# Patient Record
Sex: MALE | Race: WHITE
[De-identification: names, ages, dates, MRNs, and addresses within clinical notes are randomized per-mention and may not be internally consistent; named-entity substitution may affect disease eponyms.]

---

## 2018-02-09 ENCOUNTER — HOSPITAL ENCOUNTER (INPATIENT)
Dept: HOSPITAL 72 - EMR | Age: 83
LOS: 3 days | Discharge: HOME | DRG: 683 | End: 2018-02-12
Payer: MEDICARE

## 2018-02-09 VITALS — SYSTOLIC BLOOD PRESSURE: 145 MMHG | DIASTOLIC BLOOD PRESSURE: 62 MMHG

## 2018-02-09 VITALS — HEIGHT: 66 IN | BODY MASS INDEX: 27.48 KG/M2 | WEIGHT: 171 LBS

## 2018-02-09 VITALS — DIASTOLIC BLOOD PRESSURE: 70 MMHG | SYSTOLIC BLOOD PRESSURE: 136 MMHG

## 2018-02-09 VITALS — SYSTOLIC BLOOD PRESSURE: 160 MMHG | DIASTOLIC BLOOD PRESSURE: 69 MMHG

## 2018-02-09 VITALS — SYSTOLIC BLOOD PRESSURE: 135 MMHG | DIASTOLIC BLOOD PRESSURE: 66 MMHG

## 2018-02-09 DIAGNOSIS — I71.4: ICD-10-CM

## 2018-02-09 DIAGNOSIS — N28.1: ICD-10-CM

## 2018-02-09 DIAGNOSIS — F32.9: ICD-10-CM

## 2018-02-09 DIAGNOSIS — I25.10: ICD-10-CM

## 2018-02-09 DIAGNOSIS — Z53.29: ICD-10-CM

## 2018-02-09 DIAGNOSIS — R10.9: ICD-10-CM

## 2018-02-09 DIAGNOSIS — E11.9: ICD-10-CM

## 2018-02-09 DIAGNOSIS — E78.00: ICD-10-CM

## 2018-02-09 DIAGNOSIS — N13.8: ICD-10-CM

## 2018-02-09 DIAGNOSIS — N40.1: ICD-10-CM

## 2018-02-09 DIAGNOSIS — I10: ICD-10-CM

## 2018-02-09 DIAGNOSIS — R33.8: ICD-10-CM

## 2018-02-09 DIAGNOSIS — Z79.84: ICD-10-CM

## 2018-02-09 DIAGNOSIS — K57.90: ICD-10-CM

## 2018-02-09 DIAGNOSIS — R55: ICD-10-CM

## 2018-02-09 DIAGNOSIS — D64.9: ICD-10-CM

## 2018-02-09 DIAGNOSIS — N39.0: ICD-10-CM

## 2018-02-09 DIAGNOSIS — K52.9: ICD-10-CM

## 2018-02-09 DIAGNOSIS — E86.0: ICD-10-CM

## 2018-02-09 DIAGNOSIS — I95.9: ICD-10-CM

## 2018-02-09 DIAGNOSIS — Z88.6: ICD-10-CM

## 2018-02-09 DIAGNOSIS — E03.9: ICD-10-CM

## 2018-02-09 DIAGNOSIS — N17.9: Primary | ICD-10-CM

## 2018-02-09 LAB
ADD MANUAL DIFF: NO
ALBUMIN SERPL-MCNC: 3.2 G/DL (ref 3.4–5)
ALBUMIN/GLOB SERPL: 1.1 {RATIO} (ref 1–2.7)
ALP SERPL-CCNC: 59 U/L (ref 46–116)
ALT SERPL-CCNC: 25 U/L (ref 12–78)
ANION GAP SERPL CALC-SCNC: 8 MMOL/L (ref 5–15)
APPEARANCE UR: (no result)
APTT PPP: YELLOW S
AST SERPL-CCNC: 14 U/L (ref 15–37)
BASOPHILS NFR BLD AUTO: 0.6 % (ref 0–2)
BILIRUB SERPL-MCNC: 0.2 MG/DL (ref 0.2–1)
BUN SERPL-MCNC: 34 MG/DL (ref 7–18)
CALCIUM SERPL-MCNC: 9.1 MG/DL (ref 8.5–10.1)
CHLORIDE SERPL-SCNC: 104 MMOL/L (ref 98–107)
CK SERPL-CCNC: 97 U/L (ref 26–308)
CO2 SERPL-SCNC: 26 MMOL/L (ref 21–32)
CREAT SERPL-MCNC: 2 MG/DL (ref 0.55–1.3)
EOSINOPHIL NFR BLD AUTO: 2.2 % (ref 0–3)
ERYTHROCYTE [DISTWIDTH] IN BLOOD BY AUTOMATED COUNT: 13.7 % (ref 11.6–14.8)
GLOBULIN SER-MCNC: 3 G/DL
GLUCOSE UR STRIP-MCNC: NEGATIVE MG/DL
HCT VFR BLD CALC: 36.5 % (ref 42–52)
HGB BLD-MCNC: 12.4 G/DL (ref 14.2–18)
KETONES UR QL STRIP: NEGATIVE
LEUKOCYTE ESTERASE UR QL STRIP: (no result)
LYMPHOCYTES NFR BLD AUTO: 14 % (ref 20–45)
MCV RBC AUTO: 94 FL (ref 80–99)
MONOCYTES NFR BLD AUTO: 7.4 % (ref 1–10)
NEUTROPHILS NFR BLD AUTO: 75.8 % (ref 45–75)
NITRITE UR QL STRIP: NEGATIVE
PH UR STRIP: 5 [PH] (ref 4.5–8)
PLATELET # BLD: 160 K/UL (ref 150–450)
POTASSIUM SERPL-SCNC: 3.8 MMOL/L (ref 3.5–5.1)
PROT UR QL STRIP: (no result)
RBC # BLD AUTO: 3.87 M/UL (ref 4.7–6.1)
SODIUM SERPL-SCNC: 137 MMOL/L (ref 136–145)
SP GR UR STRIP: 1.01 (ref 1–1.03)
UROBILINOGEN UR-MCNC: NORMAL MG/DL (ref 0–1)
WBC # BLD AUTO: 12.2 K/UL (ref 4.8–10.8)

## 2018-02-09 PROCEDURE — 89050 BODY FLUID CELL COUNT: CPT

## 2018-02-09 PROCEDURE — 85651 RBC SED RATE NONAUTOMATED: CPT

## 2018-02-09 PROCEDURE — 99285 EMERGENCY DEPT VISIT HI MDM: CPT

## 2018-02-09 PROCEDURE — 93970 EXTREMITY STUDY: CPT

## 2018-02-09 PROCEDURE — 74176 CT ABD & PELVIS W/O CONTRAST: CPT

## 2018-02-09 PROCEDURE — 76700 US EXAM ABDOM COMPLETE: CPT

## 2018-02-09 PROCEDURE — 87045 FECES CULTURE AEROBIC BACT: CPT

## 2018-02-09 PROCEDURE — 84133 ASSAY OF URINE POTASSIUM: CPT

## 2018-02-09 PROCEDURE — 85730 THROMBOPLASTIN TIME PARTIAL: CPT

## 2018-02-09 PROCEDURE — 76775 US EXAM ABDO BACK WALL LIM: CPT

## 2018-02-09 PROCEDURE — 82728 ASSAY OF FERRITIN: CPT

## 2018-02-09 PROCEDURE — 82607 VITAMIN B-12: CPT

## 2018-02-09 PROCEDURE — 82550 ASSAY OF CK (CPK): CPT

## 2018-02-09 PROCEDURE — 83735 ASSAY OF MAGNESIUM: CPT

## 2018-02-09 PROCEDURE — 82746 ASSAY OF FOLIC ACID SERUM: CPT

## 2018-02-09 PROCEDURE — 93005 ELECTROCARDIOGRAM TRACING: CPT

## 2018-02-09 PROCEDURE — 84550 ASSAY OF BLOOD/URIC ACID: CPT

## 2018-02-09 PROCEDURE — 82150 ASSAY OF AMYLASE: CPT

## 2018-02-09 PROCEDURE — 85025 COMPLETE CBC W/AUTO DIFF WBC: CPT

## 2018-02-09 PROCEDURE — 84300 ASSAY OF URINE SODIUM: CPT

## 2018-02-09 PROCEDURE — 82378 CARCINOEMBRYONIC ANTIGEN: CPT

## 2018-02-09 PROCEDURE — 83550 IRON BINDING TEST: CPT

## 2018-02-09 PROCEDURE — 87324 CLOSTRIDIUM AG IA: CPT

## 2018-02-09 PROCEDURE — 85044 MANUAL RETICULOCYTE COUNT: CPT

## 2018-02-09 PROCEDURE — 84443 ASSAY THYROID STIM HORMONE: CPT

## 2018-02-09 PROCEDURE — 87086 URINE CULTURE/COLONY COUNT: CPT

## 2018-02-09 PROCEDURE — 84484 ASSAY OF TROPONIN QUANT: CPT

## 2018-02-09 PROCEDURE — 81003 URINALYSIS AUTO W/O SCOPE: CPT

## 2018-02-09 PROCEDURE — 84439 ASSAY OF FREE THYROXINE: CPT

## 2018-02-09 PROCEDURE — 82270 OCCULT BLOOD FECES: CPT

## 2018-02-09 PROCEDURE — 81001 URINALYSIS AUTO W/SCOPE: CPT

## 2018-02-09 PROCEDURE — 83540 ASSAY OF IRON: CPT

## 2018-02-09 PROCEDURE — 85610 PROTHROMBIN TIME: CPT

## 2018-02-09 PROCEDURE — 80053 COMPREHEN METABOLIC PANEL: CPT

## 2018-02-09 PROCEDURE — 84100 ASSAY OF PHOSPHORUS: CPT

## 2018-02-09 PROCEDURE — 82962 GLUCOSE BLOOD TEST: CPT

## 2018-02-09 PROCEDURE — 83690 ASSAY OF LIPASE: CPT

## 2018-02-09 PROCEDURE — 36415 COLL VENOUS BLD VENIPUNCTURE: CPT

## 2018-02-09 PROCEDURE — 83036 HEMOGLOBIN GLYCOSYLATED A1C: CPT

## 2018-02-09 RX ADMIN — HEPARIN SODIUM SCH UNITS: 5000 INJECTION INTRAVENOUS; SUBCUTANEOUS at 09:00

## 2018-02-09 RX ADMIN — PREGABALIN SCH MG: 50 CAPSULE ORAL at 21:26

## 2018-02-09 RX ADMIN — ALLOPURINOL SCH MG: 100 TABLET ORAL at 17:44

## 2018-02-09 RX ADMIN — HYDRALAZINE HYDROCHLORIDE SCH MG: 10 TABLET ORAL at 17:44

## 2018-02-09 RX ADMIN — HYDRALAZINE HYDROCHLORIDE SCH MG: 10 TABLET ORAL at 09:47

## 2018-02-09 RX ADMIN — INSULIN ASPART SCH UNITS: 100 INJECTION, SOLUTION INTRAVENOUS; SUBCUTANEOUS at 11:30

## 2018-02-09 RX ADMIN — PREGABALIN SCH MG: 50 CAPSULE ORAL at 09:47

## 2018-02-09 RX ADMIN — INSULIN ASPART SCH UNITS: 100 INJECTION, SOLUTION INTRAVENOUS; SUBCUTANEOUS at 21:25

## 2018-02-09 RX ADMIN — DULOXETINE HYDROCHLORIDE SCH MG: 30 CAPSULE, DELAYED RELEASE ORAL at 09:45

## 2018-02-09 RX ADMIN — INSULIN ASPART SCH UNITS: 100 INJECTION, SOLUTION INTRAVENOUS; SUBCUTANEOUS at 16:30

## 2018-02-09 RX ADMIN — ALLOPURINOL SCH MG: 100 TABLET ORAL at 09:46

## 2018-02-09 RX ADMIN — HEPARIN SODIUM SCH UNITS: 5000 INJECTION INTRAVENOUS; SUBCUTANEOUS at 21:00

## 2018-02-09 RX ADMIN — INSULIN ASPART SCH UNITS: 100 INJECTION, SOLUTION INTRAVENOUS; SUBCUTANEOUS at 06:30

## 2018-02-09 NOTE — CARDIOLOGY REPORT
--------------- APPROVED REPORT --------------





EKG Measurement

Heart [id number]

OR 240P

[id number]

[id number]

[id number]





Sinus rhythm with 1st degree AV block with premature supraventricular complexes

Inferior infarct, age undetermined

Abnormal ECG

## 2018-02-09 NOTE — EMERGENCY ROOM REPORT
History of Present Illness


General


Chief Complaint:  Abdominal Pain


Source:  Patient





Present Illness


HPI


Is an 82-year-old male with a history diabetes, high blood pressure and 

coronary disease.  He presents with chief complaint of weakness and near 

syncope.  The last 3 days he had profuse watery diarrhea.  No recent 

antibiotics.  Abdominal cramps.  He was on the toilet having diarrhea.  He 

stood up, he felt lightheaded and had near syncopal episode.  No loss of 

consciousness.  Better when he lies flat.  Per EMS he was hypotensive.  Better 

now.


Allergies:  


Coded Allergies:  


     MORPHINE (Verified  Allergy, Unknown, 2/9/18)





Patient History


Past Medical History:  see triage record, old chart reviewed, DM, CAD


Past Surgical History:  other


Pertinent Family History:  none


Social History:  Denies: smoking


Immunizations:  other


Reviewed Nursing Documentation:  PMH: Agreed, PSxH: Agreed





Nursing Documentation-PMH


Hx Cardiac Problems:  Yes


Hx Hypertension:  Yes


Hx Diabetes:  Yes





Review of Systems


Constitutional:  Reports: weakness


Eye:  Denies: eye pain, blurred vision


ENT:  Denies: ear pain, nose congestion, throat swelling


Respiratory:  Denies: cough, shortness of breath


Cardiovascular:  Denies: chest pain, palpitations


Gastrointestinal:  Reports: abdominal pain, diarrhea, Denies: nausea, vomiting


Musculoskeletal:  Denies: back pain, joint pain


Skin:  Denies: rash


Neurological:  Denies: headache, numbness


Endocrine:  Denies: increased thirst, increased urine


Hematologic/Lymphatic:  Denies: easy bruising


All Other Systems:  negative except mentioned in HPI





Physical Exam





Vital Signs








  Date Time  Temp Pulse Resp B/P (MAP) Pulse Ox O2 Delivery O2 Flow Rate FiO2


 


2/9/18 02:52 97.9 79 18 157/64 98 Room Air  





vitals with high blood pressure


Sp02 EP Interpretation:  reviewed, normal


General Appearance:  well appearing, no apparent distress, alert


Head:  normocephalic, atraumatic


Eyes:  bilateral eye PERRL, bilateral eye EOMI


ENT:  hearing grossly normal, normal pharynx


Neck:  full range of motion, supple, no meningismus


Respiratory:  chest non-tender, lungs clear, normal breath sounds


Cardiovascular #1:  regular rate, rhythm, no murmur


Gastrointestinal:  normal bowel sounds, no mass, no organomegaly, no bruit, non-

distended, tenderness - Diffuse


Musculoskeletal:  back normal, gait/station normal, normal range of motion


Psychiatric:  mood/affect normal


Skin:  warm/dry





Medical Decision Making


Diagnostic Impression:  


 Primary Impression:  


 Colitis, acute


 Additional Impressions:  


 Dehydration


 JULISSA (acute kidney injury)


 Protracted diarrhea


ER Course


Patient presents with protracted diarrhea and dehydration.  Near syncope 

probably secondary to prostatic hypotension.  He felt better now.  CT scan 

showed possible sigmoiditis.  Antibiotics given.  Will admit for IV hydration.


Lab Results Impression


labs with elevated BUN AND CREATININE


EKG Diagnostic Results


Rate:  normal


Rhythm:  NSR


ST Segments:  no acute changes





Rhythm Strip Diag. Results


Rhythm Strip Time:  05:35


EP Interpretation:  yes


Rate:  97


Rhythm:  NSR, no PVC's, no ectopy





CT/MRI/US Diagnostic Results


CT/MRI/US Diagnostic Results :  


   Imaging Test Ordered:  CT abdomen and pelvis


   Impression


Read by radiologist.  Enlarged prostate gland.  Moderate colonic 

diverticulosis.  Mild circumferential wall thickening of the sigmoid and rectum.





Last Vital Signs








  Date Time  Temp Pulse Resp B/P (MAP) Pulse Ox O2 Delivery O2 Flow Rate FiO2


 


2/9/18 03:05 97.8 70 17 160/69 97 Room Air  








Status:  improved


Disposition:  ADMITTED AS INPATIENT


Condition:  Serious


Referrals:  


NON PHYSICIAN (PCP)











RAGHU DE LOS SANTOS M.D. Feb 9, 2018 05:36

## 2018-02-09 NOTE — HISTORY & PHYSICAL
History and Physical


History & Physicial


Dictated for Int Med-Dr [id number].LEAH Pedersen Feb 9, 2018 20:45

## 2018-02-09 NOTE — GI INITIAL CONSULT NOTE
Pascale Graham N.P. 2/9/18 1030:


History of Present Illness


General


Date patient seen:  Feb 9, 2018


Time patient seen:  10:22


Reason for Hospitalization:  Abdominal Pain


Referring physician:  SCOTT MARTINEZ


Reason for Consultation:  ABDOMINAL PAIN





Present Illness


HPI


Is an 82-year-old male with a history diabetes, high blood pressure and 

coronary disease.  He presents with chief complaint of weakness and near 

syncope.  The last 3 days he had profuse watery diarrhea.  No recent 

antibiotics.  Abdominal cramps.  He was on the toilet having diarrhea.  He 

stood up, he felt lightheaded and had near syncopal episode.  No loss of 

consciousness.  Better when he lies flat.  Per EMS he was hypotensive.  Better 

now.


GI consulted for abdominal pain.  Pt seen on floor, awake A&Ox4 NAD with no 

active s/sx of N/V.  Patient had complaints of water diarrhea x 3 days he 

described as "black" and with seldom periods of noted red blood.  He has a 

history of prostate surgery approximately 2 months ago.  His last colonoscopy 

was 5 years ago with some found colonic polyps.  APCT today shows sigmoid, 

rectal wall thickening.  Denies any unintentional weight loss or changes in 

dietary habits.  No recent travels.  He presents today with mild leukocytosis, 

mild anemia, and renal insufficiency.


Home Meds


Reported Medications


Amlodipine Besylate* (AMLODIPINE BESYLATE*) 10 Mg Tablet, 0.5 TAB ORAL BID, TAB


   2/9/18


Allopurinol* (ALLOPURINOL*) 100 Mg Tablet, 100 MG ORAL BID, TAB


   2/9/18


Diphenhydramine Hcl* (DIPHENHYDRAMINE HCL*) 25 Mg Capsule, 25 MG ORAL BID Y for 

Itching, #30 CAP 0 Refills


   2/9/18


Hydrocortisone (CORTIZONE 10) 28 Gm Gel..gram., 28 GM TP DAILY, GM


   2/9/18


Atenolol* (TENORMIN*) 50 Mg Tablet, 50 MG ORAL DAILY, TAB


   2/9/18


Aspirin* (ASPIRIN*) 81 Mg Tab.chew, 81 MG ORAL DAILY, TAB


   2/9/18


Linagliptin (TRADJENTA) 5 Mg Tablet, 0.5 TAB PO BID, TAB


   2/9/18


Levothyroxine Sodium* (LEVOTHYROXINE SODIUM*) 25 Mcg Tablet, 1.5 TAB ORAL DAILY

, TAB


   Take in the morning on an empty stomach, at least 30 minutes before


   food.


   2/9/18


Isosorbide Mononitrate (ISOSORBIDE MONONITRATE ER) 60 Mg Tab.er.24h, 60 MG PO 

BID, TAB


   2/9/18


Hydralazine Hcl* (HYDRALAZINE HCL*) 50 Mg Tablet, 2 TAB ORAL BID, TAB


   2/9/18


Glimepiride* (GLIMEPIRIDE*) 4 Mg Tablet, 4 MG ORAL BID, TAB


   2/9/18


Furosemide* (LASIX*) 20 Mg Tablet, 10 MG ORAL DAILY, TAB


   2/9/18


Ferrous Sulfate* (FERROUS SULFATE*) 325 Mg Tablet, 325 MG ORAL DAILY, TAB 0 

Refills


   2/9/18


Duloxetine Hcl* (CYMBALTA*) 30 Mg Capsule.dr, 30 MG ORAL DAILY, CAP


   2/9/18


Terazosin Hcl (TERAZOSIN HCL) 10 Mg Capsule, 10 MG ORAL DAILY, CAP


   2/9/18


Rosuvastatin Calcium* (CRESTOR*) 10 Mg Tablet, 10 MG ORAL DAILY, TAB


   2/9/18


Pregabalin (Lyrica) 50 Mg Capsule, 50 MG ORAL BID, CAP


   2/9/18


Omega-3 Fatty Acids/Fish Oil (OMEGA 3 FISH OIL SOFTGEL) 1 Each Capsule.dr, 1 

EACH PO, CAP


   2/9/18


Med list reviewed/reconciled:  Yes


Allergies:  


Coded Allergies:  


     MORPHINE (Verified  Allergy, Unknown, 2/9/18)





Patient History


History Provided By:  Patient, Medical Record


PMH Narrative


Past Medical History:  see triage record, old chart reviewed, DM, CAD


Past Surgical History:  other


Pertinent Family History:  none


Social History:  Denies: smoking


Immunizations:  other


Reviewed Nursing Documentation:  PMH: Agreed, PSxH: Agreed





Nursing Documentation-PMH


Hx Cardiac Problems:  Yes


Hx Hypertension:  Yes


Hx Diabetes:  Yes





Review of Systems


Constitutional:  Reports: weakness


Eye:  Denies: eye pain, blurred vision


ENT:  Denies: ear pain, nose congestion, throat swelling


Respiratory:  Denies: cough, shortness of breath


Cardiovascular:  Denies: chest pain, palpitations


Gastrointestinal:  Reports: abdominal pain, diarrhea, Denies: nausea, vomiting


Musculoskeletal:  Denies: back pain, joint pain


Skin:  Denies: rash


Neurological:  Denies: headache, numbness


Endocrine:  Denies: increased thirst, increased urine


Hematologic/Lymphatic:  Denies: easy bruising


All Other Systems:  negative except mentioned in HPI


Social History:  Denies: smoking, alcohol use, drug use, other





Review of Systems


All Other Systems:  negative except mentioned in HPI





Physical Exam





Vital Signs








  Date Time  Temp Pulse Resp B/P (MAP) Pulse Ox O2 Delivery O2 Flow Rate FiO2


 


2/9/18 02:52 97.9 79 18 157/64 98 Room Air  








Sp02 EP Interpretation:  reviewed, normal


Labs





Laboratory Tests








Test


  2/9/18


03:33 2/9/18


04:00 2/9/18


06:00


 


White Blood Count


  12.2 K/UL


(4.8-10.8)  H 


  


 


 


Red Blood Count


  3.87 M/UL


(4.70-6.10)  L 


  


 


 


Hemoglobin


  12.4 G/DL


(14.2-18.0)  L 


  


 


 


Hematocrit


  36.5 %


(42.0-52.0)  L 


  


 


 


Mean Corpuscular Volume 94 FL (80-99)    


 


Mean Corpuscular Hemoglobin


  32.1 PG


(27.0-31.0)  H 


  


 


 


Mean Corpuscular Hemoglobin


Concent 34.0 G/DL


(32.0-36.0) 


  


 


 


Red Cell Distribution Width


  13.7 %


(11.6-14.8) 


  


 


 


Platelet Count


  160 K/UL


(150-450) 


  


 


 


Mean Platelet Volume


  9.5 FL


(6.5-10.1) 


  


 


 


Neutrophils (%) (Auto)


  75.8 %


(45.0-75.0)  H 


  


 


 


Lymphocytes (%) (Auto)


  14.0 %


(20.0-45.0)  L 


  


 


 


Monocytes (%) (Auto)


  7.4 %


(1.0-10.0) 


  


 


 


Eosinophils (%) (Auto)


  2.2 %


(0.0-3.0) 


  


 


 


Basophils (%) (Auto)


  0.6 %


(0.0-2.0) 


  


 


 


Sodium Level


  137 MMOL/L


(136-145) 


  


 


 


Potassium Level


  3.8 MMOL/L


(3.5-5.1) 


  


 


 


Chloride Level


  104 MMOL/L


() 


  


 


 


Carbon Dioxide Level


  26 MMOL/L


(21-32) 


  


 


 


Anion Gap


  8 mmol/L


(5-15) 


  


 


 


Blood Urea Nitrogen


  34 mg/dL


(7-18)  H 


  


 


 


Creatinine


  2.0 MG/DL


(0.55-1.30)  H 


  


 


 


Estimat Glomerular Filtration


Rate  mL/min (>60)  


  


  


 


 


Glucose Level


  146 MG/DL


()  H 


  


 


 


Calcium Level


  9.1 MG/DL


(8.5-10.1) 


  


 


 


Total Bilirubin


  0.2 MG/DL


(0.2-1.0) 


  


 


 


Aspartate Amino Transf


(AST/SGOT) 14 U/L (15-37)


L 


  


 


 


Alanine Aminotransferase


(ALT/SGPT) 25 U/L (12-78)


  


  


 


 


Alkaline Phosphatase


  59 U/L


() 


  


 


 


Total Protein


  6.2 G/DL


(6.4-8.2)  L 


  


 


 


Albumin


  3.2 G/DL


(3.4-5.0)  L 


  


 


 


Globulin 3.0 g/dL    


 


Albumin/Globulin Ratio 1.1 (1.0-2.7)    


 


Lipase


  120 U/L


() 


  


 


 


Troponin I


  


  0.010 ng/mL


(0.000-0.056) 


 


 


Urine Color   Yellow  


 


Urine Appearance   Cloudy  


 


Urine pH   5 (4.5-8.0)  


 


Urine Specific Gravity


  


  


  1.015


(1.005-1.035)


 


Urine Protein


  


  


  3+ (NEGATIVE)


H


 


Urine Glucose (UA)


  


  


  Negative


(NEGATIVE)


 


Urine Ketones


  


  


  Negative


(NEGATIVE)


 


Urine Occult Blood


  


  


  4+ (NEGATIVE)


H


 


Urine Nitrite


  


  


  Negative


(NEGATIVE)


 


Urine Bilirubin


  


  


  Negative


(NEGATIVE)


 


Urine Urobilinogen


  


  


  Normal MG/DL


(0.0-1.0)


 


Urine Leukocyte Esterase


  


  


  3+ (NEGATIVE)


H


 


Urine RBC


  


  


  60-80 /HPF (0


- 0)  H


 


Urine WBC


  


  


  Tntc /HPF (0 -


0)  H


 


Urine Squamous Epithelial


Cells 


  


  Few /LPF


(NONE/OCC)


 


Urine Bacteria


  


  


  Moderate /HPF


(NONE)  H








General Appearance:  well appearing, no apparent distress, alert


Head:  normocephalic


EENT:  PERRL/EOMI, normal ENT inspection


Neck:  supple


Respiratory:  normal breath sounds, no respiratory distress


Cardiovascular:  normal rate


Gastrointestinal:  normal inspection, non tender, soft, normal bowel sounds, non

-distended


Rectal:  deferred


Genitourinary:  deferred


Musculoskeletal:  normal inspection, back normal


Neurologic:  normal inspection, alert, oriented x3, responsive


Psychiatric:  normal inspection, judgement/insight normal, memory normal


Skin:  normal inspection, normal color, no rash, warm/dry, palpation normal, 

well hydrated


Lymphatic:  normal inspection, no adenopathy


Current Medications





Current Medications








 Medications


  (Trade)  Dose


 Ordered  Sig/Tramaine


 Route


 PRN Reason  Start Time


 Stop Time Status Last Admin


Dose Admin


 


 Acetaminophen


  (Tylenol)  650 mg  Q4H  PRN


 ORAL


 fever  2/9/18 06:15


 3/11/18 06:14   


 


 


 Al Hydroxide/Mg


 Hydroxide


  (Mylanta II)  30 ml  Q6H  PRN


 ORAL


 dyspepsia  2/9/18 06:15


 3/11/18 06:14   


 


 


 Allopurinol


  (Zyloprim)  100 mg  BID


 ORAL


   2/9/18 09:00


 3/11/18 08:59  2/9/18 09:46


 


 


 Amlodipine


 Besylate


  (Norvasc)  5 mg  DAILY


 ORAL


   2/9/18 09:00


 3/11/18 08:59  2/9/18 09:46


 


 


 Atenolol


  (Tenormin)  50 mg  DAILY


 ORAL


   2/9/18 09:00


 3/11/18 08:59  2/9/18 09:45


 


 


 Dextrose


  (Dextrose 50%)    STAT  PRN


 IV


 Hypoglycemia  2/9/18 06:15


 3/11/18 06:14   


 


 


 Diphenhydramine


 HCl


  (Benadryl)  25 mg  Q6H  PRN


 ORAL


 Itching/Pruritis  2/9/18 06:15


 3/11/18 06:14   


 


 


 Duloxetine HCl


  (Cymbalta)  30 mg  DAILY


 ORAL


   2/9/18 09:00


 3/11/18 08:59  2/9/18 09:45


 


 


 Heparin Sodium


  (Porcine)


  (Heparin 5000


 units/ml)  5,000 units  EVERY 12  HOURS


 SUBQ


   2/9/18 09:00


 3/11/18 08:59   


 


 


 Hydralazine HCl


  (Apresoline)  20 mg  BID


 ORAL


   2/9/18 09:00


 3/11/18 08:59  2/9/18 09:47


 


 


 Insulin Aspart


  (NovoLOG)    BEFORE MEALS AND  HS


 SUBQ


   2/9/18 06:30


 3/11/18 06:29   


 


 


 Ketorolac


 Tromethamine


  (Toradol 30mg)  30 mg  Q6H  PRN


 IV


 moderate pian 4-6  2/9/18 06:15


 2/14/18 06:14 UNV  


 


 


 Levothyroxine


 Sodium


  (Synthroid)  75 mcg  DAILY@0630


 ORAL


   2/9/18 06:30


 3/11/18 06:29   


 


 


 Nitroglycerin


  (Ntg)  0.4 mg  Q5M X 3 DOSES PRN


 SL


 Prn Chest Pain  2/9/18 06:15


 3/11/18 06:14   


 


 


 Ondansetron HCl


  (Zofran)  4 mg  Q6H  PRN


 IVP


 Nausea & Vomiting  2/9/18 06:15


 3/11/18 06:14   


 


 


 Polyethylene


 Glycol


  (Miralax)  17 gm  HSPRN  PRN


 ORAL


 Constipation  2/9/18 06:15


 3/11/18 06:14   


 


 


 Pregabalin


  (Lyrica)  50 mg  Q12HR


 ORAL


   2/9/18 09:00


 3/11/18 08:59  2/9/18 09:47


 


 


 Sodium Chloride  1,000 ml @ 


 50 mls/hr  Q20H


 IV


   2/9/18 06:03


 3/11/18 06:02  2/9/18 06:46


 


 


 Temazepam


  (Restoril)  15 mg  HSPRN  PRN


 ORAL


 Insomnia  2/9/18 06:15


 2/16/18 06:14   


 











GI: Plan


Problems:  


(1) Colitis, acute


(2) Protracted diarrhea


(3) Dehydration


Plan


recommend colonoscopy, patient refuses at this time and request his POC be 

discussed with his primary care 


adv to renal diet after imaging studies >> renal/abdominal U/S


electrolyte correction


send for stool studies, cdiff


anemia work up


OB stool r/o GI bleed


monitor H&H, prn transfusions


ppi


fu labs





Discussed with Dr. Teague.


Thank you for this patient referral, we will follow.





SANJAY TEAGUE 2/12/18 1517:


History of Present Illness


General


Reason for Hospitalization:  Abdominal Pain





Present Illness


Home Meds


Reported Medications


Amlodipine Besylate* (AMLODIPINE BESYLATE*) 10 Mg Tablet, 0.5 TAB ORAL BID, TAB


   2/9/18


Allopurinol* (ALLOPURINOL*) 100 Mg Tablet, 100 MG ORAL BID, TAB


   2/9/18


Diphenhydramine Hcl* (DIPHENHYDRAMINE HCL*) 25 Mg Capsule, 25 MG ORAL BID Y for 

Itching, #30 CAP 0 Refills


   2/9/18


Hydrocortisone (CORTIZONE 10) 28 Gm Gel..gram., 28 GM TP DAILY, GM


   2/9/18


Atenolol* (TENORMIN*) 50 Mg Tablet, 50 MG ORAL DAILY, TAB


   2/9/18


Aspirin* (ASPIRIN*) 81 Mg Tab.chew, 81 MG ORAL DAILY, TAB


   2/9/18


Linagliptin (TRADJENTA) 5 Mg Tablet, 0.5 TAB PO BID, TAB


   2/9/18


Levothyroxine Sodium* (LEVOTHYROXINE SODIUM*) 25 Mcg Tablet, 1.5 TAB ORAL DAILY

, TAB


   Take in the morning on an empty stomach, at least 30 minutes before


   food.


   2/9/18


Isosorbide Mononitrate (ISOSORBIDE MONONITRATE ER) 60 Mg Tab.er.24h, 60 MG PO 

BID, TAB


   2/9/18


Hydralazine Hcl* (HYDRALAZINE HCL*) 50 Mg Tablet, 2 TAB ORAL BID, TAB


   2/9/18


Glimepiride* (GLIMEPIRIDE*) 4 Mg Tablet, 4 MG ORAL BID, TAB


   2/9/18


Furosemide* (LASIX*) 20 Mg Tablet, 10 MG ORAL DAILY, TAB


   2/9/18


Ferrous Sulfate* (FERROUS SULFATE*) 325 Mg Tablet, 325 MG ORAL DAILY, TAB 0 

Refills


   2/9/18


Duloxetine Hcl* (CYMBALTA*) 30 Mg Capsule.dr, 30 MG ORAL DAILY, CAP


   2/9/18


Terazosin Hcl (TERAZOSIN HCL) 10 Mg Capsule, 10 MG ORAL DAILY, CAP


   2/9/18


Rosuvastatin Calcium* (CRESTOR*) 10 Mg Tablet, 10 MG ORAL DAILY, TAB


   2/9/18


Pregabalin (Lyrica) 50 Mg Capsule, 50 MG ORAL BID, CAP


   2/9/18


Omega-3 Fatty Acids/Fish Oil (OMEGA 3 FISH OIL SOFTGEL) 1 Each Capsule.dr, 1 

EACH PO, CAP


   2/9/18


Allergies:  


Coded Allergies:  


     MORPHINE (Verified  Allergy, Unknown, 2/9/18)





GI: Plan


Plan


The patient was seen and examined at bedside and all new and available data was 

reviewed in the patients chart. I agree with the above findings, impression 

and plan.  (Patient seen earlier today. Signature stamp does not reflect 

patient encounter time.). - MD Gladys [facility] N.PEd Feb 9, 2018 10:30


SANJAY TEAGUE Feb 12, 2018 15:17

## 2018-02-09 NOTE — HISTORY AND PHYSICAL REPORT
DATE OF ADMISSION:  02/09/2018



CHIEF COMPLAINT:  The patient is an 82-year-old white male, presents with

chief complaint of diarrhea and near syncope.



HISTORY OF PRESENT ILLNESS:  Began three days prior to admission.  The

patient began to have watery diarrhea.  The patient had multiple bowel

movements daily.  The patient was complaining of increasing weakness.  The

patient almost passed out.



The patient presented to [place] emergency room.  The patient was found

to be hypotensive.  The patient was admitted for dehydration and

diarrhea.



REVIEW OF SYSTEMS:  CONSTITUTIONAL:  The patient denies weight loss or

weight gain.  The patient denies fevers or chills   HEENT:  The patient

denies ear or throat pain.  The patient denies headache.

CARDIOVASCULAR:  The patient denies palpitations or chest pain.    CHEST:

The patient denies wheezes or shortness of breath.  ABDOMEN:  The patient

complains of diarrhea as above.  The patient denies nausea, vomiting, or

constipation.  GENITOURINARY:  The patient denies dysuria or increased

frequency of urination.    NEUROMUSCULAR:  The patient denies seizures or

generalized weakness.



PAST MEDICAL HISTORY:  Significant for:



1. Hypertension.

2. Diabetes type 2.

3. Hypothyroidism.

4. Hypercholesterolemia.

5. Depression.



PAST SURGICAL HISTORY:  The patient denies.



CURRENT MEDICATIONS:

1. Allopurinol 100 mg p.o. twice daily.

2. Amlodipine 10 mg one half tablet p.o. twice daily.

3. Aspirin 81 mg p.o. daily.

4. Atenolol 50 mg p.o. daily.

5. Cymbalta 30 mg p.o. daily.

6. Iron sulfate 325 mg p.o. daily.

7. Lasix 10 mg p.o. daily.

8. Glimepiride 4 mg p.o. twice daily.

9. Hydralazine 50 mg p.o. twice daily.

10. Cortisone as needed.

11. Isosorbide mononitrate 60 mg p.o. twice daily.

12. Levoxyl 25 mcg one and half tablets p.o. daily.

13. Tradjenta 5 mg by mouth one and half tablet p.o. twice daily.

14. Lyrica 50 mg p.o. twice daily.

15. Crestor 10 mg p.o. daily.

16. Terazosin 10 mg p.o. daily.



ALLERGIES:  To morphine.



SOCIAL HISTORY:  The patient is single.  The patient denies tobacco or

alcohol use.



PHYSICAL EXAMINATION:

VITAL SIGNS:  Temperature 97.9, respirations 22, pulse 93, and blood

pressure 145/62.

GENERAL:  The patient is well-developed and well-nourished elderly white

male, in no apparent distress.

HEENT:  Eyes, pupils are equal and responsive to light and accommodation.

Extraocular movements are intact.

NECK:  Supple without lymphadenopathy.

CHEST:  Lungs are clear to auscultation bilaterally without wheezes or

rales.

CARDIOVASCULAR:  Regular rhythm and rate.  S1, S2 normal without murmurs,

rubs, or gallops.

ABDOMEN:  Soft, distended with decreased bowel sounds.  No evidence of

hepatosplenomegaly.  Currently, no rebound or guarding noted.

EXTREMITIES:  Negative for clubbing, cyanosis, or edema.

RECTAL/GENITAL:  Refused.

NEUROLOGIC:  Cranial nerves II through XII are grossly intact without focal

deficits.  Motor strength is 5/5 bilaterally.  Deep tendon reflexes 2+

plantar.



LABORATORY AND DIAGNOSTIC DATA:  Laboratory studies, WBC 12.2, hemoglobin

12.4, hematocrit 36.5, and platelets _____.  Sodium 137, potassium 3.8,

chloride 104, CO2 26, BUN 34, creatinine 2.0, and glucose 146.  A CT scan

of the abdomen showed diverticulosis without diverticulitis.



ASSESSMENT:  This is an 82-year-old white male with,



1. Near syncope.

2. Diarrhea.

3. Dehydration.

4. Diverticulosis.

5. Renal failure.

6. Hypertension.

7. Diabetes type 2.

8. Hypothyroidism.

9. Hypercholesterolemia.



IMPRESSION:

1. Diarrhea.  Gastroenterology consultation has been obtained with Dr. Mitchel Bingham.  A colonoscopy has been ordered.  We will follow

recommendations of Gastroenterology.  Stool studies are pending.

2. Renal failure.  A Nephrology consultation has been obtained with Dr. Salvador.

3. Dehydration.  The patient is receiving intravenous fluids.

4. Hypertension.  Continue amlodipine and atenolol as above.

5. Diabetes type 2.  The patient has been started on a regular insulin

sliding scale.

6. Hypothyroidism.  Continue Levoxyl as above.

7. Hypercholesteremia.  Continue Crestor as above.

8. Depression.  Continue Cymbalta as above.









  ______________________________________________

  Alexx SOPHIA Mejias





DR:  CHRIS

D:  02/09/2018 20:44

T:  02/09/2018 23:29

JOB#:  [id number]

CC:

## 2018-02-09 NOTE — DIAGNOSTIC IMAGING REPORT
Indication: Abdominal pain x3 days

 

Technique: Spiral acquisitions obtained through the abdomen and pelvis. No oral

contrast utilized, per emergency room physician request No IV contrast utilized,  per

referring physician request.. Multiplanar reconstructions were generated. Total dose

length product 883.19 mGycm. CTDIvol(s) 16.14 mGy. Dose reduction achieved using

automated exposure control

 

 

Comparison: None

 

Findings: 

 

Findings is normal. There is colonic diverticulosis. No definite evidence of acute

diverticulitis. No small bowel distention. No free or loculated peritoneal air or

fluid. There is a small sliding hiatal hernia. The remainder of the stomach is

unremarkable. The there is a small duodenal diverticulum.

 

Lack of IV contrast limits assessment of the solid organs. The liver, gallbladder,

bile ducts, pancreas, spleen, adrenals are unremarkable. The kidneys demonstrate

multiple cysts. There are also multiple masses that demonstrate nonspecific soft

tissue attenuation, only one of which, a small one in the right upper pole, is

definitively hyperdense. No pelvic mass or adenopathy. The prostate is markedly

enlarged, measuring 7.4 cm transverse by 6.1 cm AP by 7.4 cm craniocaudad.

 

There is a saccular infrarenal abdominal aortic aneurysm, that measures 5.1 cm

transverse dimension. There are no findings to suggest leakage or rupture.

 

The included lung bases demonstrate posterior dependent atelectatic changes. The

heart is enlarged. The bones demonstrate degenerative spondylosis changes. There is

slight anterior offset of L4 on L5.

 

Impression: Colonic diverticulosis. No evidence of diverticulitis

 

5.1 cm saccular infrarenal abdominal aortic aneurysm

 

Prostatomegaly

 

Bilateral renal cysts. Bilateral renal soft tissue attenuation lesions which are most

likely benign definitively hyperdense cyst. Further evaluation with ultrasound

recommended.

 

Cardiomegaly

 

Degenerative spondylosis changes

 

This agrees with the preliminary interpretation provided overnight by [facility]

teleradiology service. 

 

The CT scanner at [facility] is accredited by the [employer]

[employer] and the scans are performed using protocols designed to limit radiation

exposure to as low as reasonably achievable to attain images of sufficient resolution

adequate for diagnostic evaluation.

## 2018-02-09 NOTE — CONSULTATION
History of Present Illness


General


Date patient seen:  Feb 9, 2018


Chief Complaint:  Abdominal Pain


Referring physician:  SCOTT MARTINEZ


Reason for Consultation:  inpatient manangement





Present Illness


HPI


 82-year-old male with a history diabetes, high blood pressure and coronary 

disease.  He presents with chief complaint of weakness and near syncope.  The 

last 3 days he had profuse watery diarrhea and  Abdominal cramps.  He was on 

the toilet having diarrhea.  He stood up, he felt lightheaded and had near 

syncopal episode.  No loss of consciousness.  Better when he lies flat.  Per 

EMS he was hypotensive in the field.  Pt is admitted because of hypotension and 

diarrhea and ATN.


Allergies:  


Coded Allergies:  


     MORPHINE (Verified  Allergy, Unknown, 2/9/18)





Medication History


Scheduled


Allopurinol* (Allopurinol*), 100 MG ORAL BID, (Reported)


Amlodipine Besylate* (Amlodipine Besylate*), 0.5 TAB ORAL BID, (Reported)


Aspirin* (Aspirin*), 81 MG ORAL DAILY, (Reported)


Atenolol* (Tenormin*), 50 MG ORAL DAILY, (Reported)


Duloxetine Hcl* (Cymbalta*), 30 MG ORAL DAILY, (Reported)


Ferrous Sulfate* (Ferrous Sulfate*), 325 MG ORAL DAILY, (Reported)


Furosemide* (Lasix*), 10 MG ORAL DAILY, (Reported)


Glimepiride* (Glimepiride*), 4 MG ORAL BID, (Reported)


Hydralazine Hcl* (Hydralazine Hcl*), 2 TAB ORAL BID, (Reported)


Hydrocortisone (Cortizone 10), 28 GM TP DAILY, (Reported)


Isosorbide Mononitrate (Isosorbide Mononitrate Er), 60 MG PO BID, (Reported)


Levothyroxine Sodium* (Levothyroxine Sodium*), 1.5 TAB ORAL DAILY, (Reported)


Linagliptin (Tradjenta), 0.5 TAB PO BID, (Reported)


Pregabalin (Lyrica), 50 MG ORAL BID, (Reported)


Rosuvastatin Calcium* (Crestor*), 10 MG ORAL DAILY, (Reported)


Terazosin Hcl (Terazosin Hcl), 10 MG ORAL DAILY, (Reported)





Scheduled PRN


Diphenhydramine Hcl* (Diphenhydramine Hcl*), 25 MG ORAL BID PRN for Itching, (

Reported)





Miscellaneous Medications


Omega-3 Fatty Acids/Fish Oil (Omega 3 Fish Oil Softgel), 1 EACH PO, (Reported)





Patient History


Healthcare decision maker





Resuscitation status


Full Code


Advanced Directive on File


No





Past Medical/Surgical History


Past Medical/Surgical History:  


(1) Abdominal aortic aneurysm (AAA) 3.0 cm to 5.5 cm in diameter in male


(2) HTN (hypertension)


(3) Hypothyroidism


(4) Hypercholesteremia


(5) Diabetes mellitus, type II


(6) BPH (benign prostatic hyperplasia)





Review of Systems


Constitutional:  Reports: malaise, weakness


Gastrointestinal:  Reports: diarrhea, vomiting


All Other Systems:  negative except mentioned in HPI





Physical Exam


General Appearance:  WD/WN


Lines, tubes and drains:  peripheral


HEENT:  normocephalic, atraumatic


Neck:  non-tender, normal alignment


Respiratory/Chest:  chest wall non-tender, lungs clear


Cardiovascular/Chest:  normal peripheral pulses, normal rate


Abdomen:  normal bowel sounds, non tender


Extremities:  normal range of motion, non-tender





Last 24 Hour Vital Signs








  Date Time  Temp Pulse Resp B/P (MAP) Pulse Ox O2 Delivery O2 Flow Rate FiO2


 


2/9/18 17:44    136/70    


 


2/9/18 16:11 97.3 89 21 136/70 97 Room Air  


 


2/9/18 09:47    145/62    


 


2/9/18 09:46  93  145/62    


 


2/9/18 09:45  93  145/62    


 


2/9/18 07:43 97.9 93 22 145/62 99 Room Air  


 


2/9/18 06:20 97.9 93 22 145/62 99 Room Air  


 


2/9/18 05:00  90 25 135/66 98 Room Air  


 


2/9/18 03:05 97.8 70 17 160/69 97 Room Air  


 


2/9/18 02:52 97.9 79 18 157/64 98 Room Air  

















Intake and Output  


 


 2/8/18 2/9/18





 18:59 06:59


 


Intake Total  1100 ml


 


Output Total  200 ml


 


Balance  900 ml


 


  


 


IV Total  1100 ml


 


Output Urine Total  200 ml


 


# Voids  1











Laboratory Tests








Test


  2/9/18


03:33 2/9/18


04:00 2/9/18


06:00 2/9/18


10:52


 


White Blood Count


  12.2 K/UL


(4.8-10.8)  H 


  


  


 


 


Red Blood Count


  3.87 M/UL


(4.70-6.10)  L 


  


  


 


 


Hemoglobin


  12.4 G/DL


(14.2-18.0)  L 


  


  


 


 


Hematocrit


  36.5 %


(42.0-52.0)  L 


  


  


 


 


Mean Corpuscular Volume 94 FL (80-99)     


 


Mean Corpuscular Hemoglobin


  32.1 PG


(27.0-31.0)  H 


  


  


 


 


Mean Corpuscular Hemoglobin


Concent 34.0 G/DL


(32.0-36.0) 


  


  


 


 


Red Cell Distribution Width


  13.7 %


(11.6-14.8) 


  


  


 


 


Platelet Count


  160 K/UL


(150-450) 


  


  


 


 


Mean Platelet Volume


  9.5 FL


(6.5-10.1) 


  


  


 


 


Neutrophils (%) (Auto)


  75.8 %


(45.0-75.0)  H 


  


  


 


 


Lymphocytes (%) (Auto)


  14.0 %


(20.0-45.0)  L 


  


  


 


 


Monocytes (%) (Auto)


  7.4 %


(1.0-10.0) 


  


  


 


 


Eosinophils (%) (Auto)


  2.2 %


(0.0-3.0) 


  


  


 


 


Basophils (%) (Auto)


  0.6 %


(0.0-2.0) 


  


  


 


 


Sodium Level


  137 MMOL/L


(136-145) 


  


  


 


 


Potassium Level


  3.8 MMOL/L


(3.5-5.1) 


  


  


 


 


Chloride Level


  104 MMOL/L


() 


  


  


 


 


Carbon Dioxide Level


  26 MMOL/L


(21-32) 


  


  


 


 


Anion Gap


  8 mmol/L


(5-15) 


  


  


 


 


Blood Urea Nitrogen


  34 mg/dL


(7-18)  H 


  


  


 


 


Creatinine


  2.0 MG/DL


(0.55-1.30)  H 


  


  


 


 


Estimat Glomerular Filtration


Rate  mL/min (>60)  


  


  


  


 


 


Glucose Level


  146 MG/DL


()  H 


  


  


 


 


Calcium Level


  9.1 MG/DL


(8.5-10.1) 


  


  


 


 


Total Bilirubin


  0.2 MG/DL


(0.2-1.0) 


  


  


 


 


Aspartate Amino Transf


(AST/SGOT) 14 U/L (15-37)


L 


  


  


 


 


Alanine Aminotransferase


(ALT/SGPT) 25 U/L (12-78)


  


  


  


 


 


Alkaline Phosphatase


  59 U/L


() 


  


  


 


 


Total Protein


  6.2 G/DL


(6.4-8.2)  L 


  


  


 


 


Albumin


  3.2 G/DL


(3.4-5.0)  L 


  


  


 


 


Globulin 3.0 g/dL     


 


Albumin/Globulin Ratio 1.1 (1.0-2.7)     


 


Lipase


  120 U/L


() 


  


  


 


 


Uric Acid


  


  5.7 MG/DL


(2.6-7.2) 


  


 


 


Total Creatine Kinase


  


  97 U/L


() 


  


 


 


Troponin I


  


  0.010 ng/mL


(0.000-0.056) 


  


 


 


Urine Color   Yellow   


 


Urine Appearance   Cloudy   


 


Urine pH   5 (4.5-8.0)   


 


Urine Specific Gravity


  


  


  1.015


(1.005-1.035) 


 


 


Urine Protein


  


  


  3+ (NEGATIVE)


H 


 


 


Urine Glucose (UA)


  


  


  Negative


(NEGATIVE) 


 


 


Urine Ketones


  


  


  Negative


(NEGATIVE) 


 


 


Urine Occult Blood


  


  


  4+ (NEGATIVE)


H 


 


 


Urine Nitrite


  


  


  Negative


(NEGATIVE) 


 


 


Urine Bilirubin


  


  


  Negative


(NEGATIVE) 


 


 


Urine Urobilinogen


  


  


  Normal MG/DL


(0.0-1.0) 


 


 


Urine Leukocyte Esterase


  


  


  3+ (NEGATIVE)


H 


 


 


Urine RBC


  


  


  60-80 /HPF (0


- 0)  H 


 


 


Urine WBC


  


  


  Tntc /HPF (0 -


0)  H 


 


 


Urine Squamous Epithelial


Cells 


  


  Few /LPF


(NONE/OCC) 


 


 


Urine Bacteria


  


  


  Moderate /HPF


(NONE)  H 


 


 


Stool Occult Blood


  


  


  


  Negative


(NEGATIVE)








Height (Feet):  5


Height (Inches):  6.00


Weight (Pounds):  171


Medications





Current Medications








 Medications


  (Trade)  Dose


 Ordered  Sig/Tramaine


 Route


 PRN Reason  Start Time


 Stop Time Status Last Admin


Dose Admin


 


 Acetaminophen


  (Tylenol)  650 mg  Q4H  PRN


 ORAL


 fever  2/9/18 06:15


 3/11/18 06:14   


 


 


 Acetaminophen


  (Tylenol)  650 mg  Q4H  PRN


 ORAL


 For Pain  2/9/18 13:00


 3/11/18 12:59   


 


 


 Al Hydroxide/Mg


 Hydroxide


  (Mylanta II)  30 ml  Q6H  PRN


 ORAL


 dyspepsia  2/9/18 06:15


 3/11/18 06:14   


 


 


 Allopurinol


  (Zyloprim)  100 mg  BID


 ORAL


   2/9/18 09:00


 3/11/18 08:59  2/9/18 17:44


 


 


 Amlodipine


 Besylate


  (Norvasc)  5 mg  DAILY


 ORAL


   2/9/18 09:00


 3/11/18 08:59  2/9/18 09:46


 


 


 Atenolol


  (Tenormin)  50 mg  DAILY


 ORAL


   2/9/18 09:00


 3/11/18 08:59  2/9/18 09:45


 


 


 Dextrose


  (Dextrose 50%)    STAT  PRN


 IV


 Hypoglycemia  2/9/18 06:15


 3/11/18 06:14   


 


 


 Diphenhydramine


 HCl


  (Benadryl)  25 mg  Q6H  PRN


 ORAL


 Itching/Pruritis  2/9/18 06:15


 3/11/18 06:14   


 


 


 Duloxetine HCl


  (Cymbalta)  30 mg  DAILY


 ORAL


   2/9/18 09:00


 3/11/18 08:59  2/9/18 09:45


 


 


 Heparin Sodium


  (Porcine)


  (Heparin 5000


 units/ml)  5,000 units  EVERY 12  HOURS


 SUBQ


   2/9/18 09:00


 3/11/18 08:59   


 


 


 Hydralazine HCl


  (Apresoline)  20 mg  BID


 ORAL


   2/9/18 09:00


 3/11/18 08:59  2/9/18 17:44


 


 


 Insulin Aspart


  (NovoLOG)    BEFORE MEALS AND  HS


 SUBQ


   2/9/18 06:30


 3/11/18 06:29  2/9/18 21:25


 


 


 Levothyroxine


 Sodium


  (Synthroid)  75 mcg  DAILY@0630


 ORAL


   2/9/18 06:30


 3/11/18 06:29   


 


 


 Nitroglycerin


  (Ntg)  0.4 mg  Q5M X 3 DOSES PRN


 SL


 Prn Chest Pain  2/9/18 06:15


 3/11/18 06:14   


 


 


 Ondansetron HCl


  (Zofran)  4 mg  Q6H  PRN


 IVP


 Nausea & Vomiting  2/9/18 06:15


 3/11/18 06:14   


 


 


 Polyethylene


 Glycol


  (Miralax)  17 gm  HSPRN  PRN


 ORAL


 Constipation  2/9/18 06:15


 3/11/18 06:14   


 


 


 Pregabalin


  (Lyrica)  50 mg  Q12HR


 ORAL


   2/9/18 09:00


 3/11/18 08:59  2/9/18 21:26


 


 


 Sodium Chloride  1,000 ml @ 


 50 mls/hr  Q20H


 IV


   2/9/18 06:03


 3/11/18 06:02  2/9/18 06:46


 


 


 Temazepam


  (Restoril)  15 mg  HSPRN  PRN


 ORAL


 Insomnia  2/9/18 06:15


 2/16/18 06:14  2/9/18 22:34


 











Assessment/Plan


Problem List:  


(1) JULISSA (acute kidney injury)


ICD Codes:  N17.9 - Acute kidney failure, unspecified


SNOMED:  24419516


(2) Protracted diarrhea


ICD Codes:  K52.9 - Noninfective gastroenteritis and colitis, unspecified


SNOMED:  929812324


(3) Diverticulosis


ICD Codes:  K57.90 - Diverticulosis of intestine, part unspecified, without 

perforation or abscess without bleeding


SNOMED:  69886715


(4) Diabetes mellitus, type II


ICD Codes:  E11.9 - Type 2 diabetes mellitus without complications


SNOMED:  94912639


(5) Abdominal aortic aneurysm (AAA) 3.0 cm to 5.5 cm in diameter in male


ICD Codes:  I71.4 - Abdominal aortic aneurysm, without rupture


SNOMED:  674675147


(6) HTN (hypertension)


ICD Codes:  I10 - Essential (primary) hypertension


SNOMED:  98726871


(7) Hypothyroidism


ICD Codes:  E03.9 - Hypothyroidism, unspecified


SNOMED:  57588217


Assessment/Plan


iv fluids


check stool cultures


GI evaluatin


check electrolytes


Urology evaluation


dvt prophylaxis











ADRIENNE CUNNINGHAM Feb 9, 2018 22:37

## 2018-02-10 VITALS — SYSTOLIC BLOOD PRESSURE: 162 MMHG | DIASTOLIC BLOOD PRESSURE: 68 MMHG

## 2018-02-10 VITALS — DIASTOLIC BLOOD PRESSURE: 66 MMHG | SYSTOLIC BLOOD PRESSURE: 148 MMHG

## 2018-02-10 VITALS — SYSTOLIC BLOOD PRESSURE: 109 MMHG | DIASTOLIC BLOOD PRESSURE: 69 MMHG

## 2018-02-10 VITALS — DIASTOLIC BLOOD PRESSURE: 68 MMHG | SYSTOLIC BLOOD PRESSURE: 161 MMHG

## 2018-02-10 LAB
% IRON SATURATION: 10 % (ref 15–50)
ADD MANUAL DIFF: NO
ALBUMIN SERPL-MCNC: 2.9 G/DL (ref 3.4–5)
ALBUMIN/GLOB SERPL: 0.9 {RATIO} (ref 1–2.7)
ALP SERPL-CCNC: 56 U/L (ref 46–116)
ALT SERPL-CCNC: 20 U/L (ref 12–78)
AMYLASE SERPL-CCNC: 71 U/L (ref 25–115)
ANION GAP SERPL CALC-SCNC: 6 MMOL/L (ref 5–15)
APPEARANCE UR: (no result)
APTT BLD: 37 SEC (ref 23–33)
APTT PPP: (no result) S
AST SERPL-CCNC: 13 U/L (ref 15–37)
BASOPHILS NFR BLD AUTO: 0.7 % (ref 0–2)
BILIRUB SERPL-MCNC: 0.5 MG/DL (ref 0.2–1)
BUN SERPL-MCNC: 26 MG/DL (ref 7–18)
CALCIUM SERPL-MCNC: 9.1 MG/DL (ref 8.5–10.1)
CHLORIDE SERPL-SCNC: 104 MMOL/L (ref 98–107)
CO2 SERPL-SCNC: 26 MMOL/L (ref 21–32)
CREAT SERPL-MCNC: 1.8 MG/DL (ref 0.55–1.3)
EOSINOPHIL NFR BLD AUTO: 3.4 % (ref 0–3)
ERYTHROCYTE [DISTWIDTH] IN BLOOD BY AUTOMATED COUNT: 13.2 % (ref 11.6–14.8)
FERRITIN SERPL-MCNC: 126 NG/ML (ref 8–388)
GLOBULIN SER-MCNC: 3.1 G/DL
GLUCOSE UR STRIP-MCNC: NEGATIVE MG/DL
HCT VFR BLD CALC: 35.4 % (ref 42–52)
HGB BLD-MCNC: 12.1 G/DL (ref 14.2–18)
INR PPP: 1 (ref 0.9–1.1)
IRON SERPL-MCNC: 22 UG/DL (ref 50–175)
KETONES UR QL STRIP: NEGATIVE
LEUKOCYTE ESTERASE UR QL STRIP: (no result)
LYMPHOCYTES NFR BLD AUTO: 20.1 % (ref 20–45)
MCV RBC AUTO: 95 FL (ref 80–99)
MONOCYTES NFR BLD AUTO: 11.7 % (ref 1–10)
NEUTROPHILS NFR BLD AUTO: 64.2 % (ref 45–75)
NITRITE UR QL STRIP: NEGATIVE
PH UR STRIP: 6 [PH] (ref 4.5–8)
PHOSPHATE SERPL-MCNC: 2.9 MG/DL (ref 2.5–4.9)
PLATELET # BLD: 152 K/UL (ref 150–450)
POTASSIUM SERPL-SCNC: 4.1 MMOL/L (ref 3.5–5.1)
PROT UR QL STRIP: (no result)
RBC # BLD AUTO: 3.74 M/UL (ref 4.7–6.1)
SODIUM SERPL-SCNC: 135 MMOL/L (ref 136–145)
SP GR UR STRIP: 1.01 (ref 1–1.03)
TIBC SERPL-MCNC: 227 UG/DL (ref 250–450)
UNSATURATED IRON BINDING: 205 UG/DL (ref 112–346)
UROBILINOGEN UR-MCNC: NORMAL MG/DL (ref 0–1)
WBC # BLD AUTO: 7.7 K/UL (ref 4.8–10.8)

## 2018-02-10 RX ADMIN — INSULIN ASPART SCH UNITS: 100 INJECTION, SOLUTION INTRAVENOUS; SUBCUTANEOUS at 20:22

## 2018-02-10 RX ADMIN — HEPARIN SODIUM SCH UNITS: 5000 INJECTION INTRAVENOUS; SUBCUTANEOUS at 09:00

## 2018-02-10 RX ADMIN — ALLOPURINOL SCH MG: 100 TABLET ORAL at 17:59

## 2018-02-10 RX ADMIN — INSULIN ASPART SCH UNITS: 100 INJECTION, SOLUTION INTRAVENOUS; SUBCUTANEOUS at 12:06

## 2018-02-10 RX ADMIN — INSULIN ASPART SCH UNITS: 100 INJECTION, SOLUTION INTRAVENOUS; SUBCUTANEOUS at 17:11

## 2018-02-10 RX ADMIN — DULOXETINE HYDROCHLORIDE SCH MG: 30 CAPSULE, DELAYED RELEASE ORAL at 09:12

## 2018-02-10 RX ADMIN — ALLOPURINOL SCH MG: 100 TABLET ORAL at 09:11

## 2018-02-10 RX ADMIN — PREGABALIN SCH MG: 50 CAPSULE ORAL at 20:19

## 2018-02-10 RX ADMIN — HYDRALAZINE HYDROCHLORIDE SCH MG: 10 TABLET ORAL at 09:12

## 2018-02-10 RX ADMIN — HYDRALAZINE HYDROCHLORIDE SCH MG: 10 TABLET ORAL at 18:08

## 2018-02-10 RX ADMIN — PREGABALIN SCH MG: 50 CAPSULE ORAL at 09:11

## 2018-02-10 RX ADMIN — TAMSULOSIN HYDROCHLORIDE SCH MG: 0.4 CAPSULE ORAL at 20:19

## 2018-02-10 RX ADMIN — INSULIN ASPART SCH UNITS: 100 INJECTION, SOLUTION INTRAVENOUS; SUBCUTANEOUS at 06:30

## 2018-02-10 NOTE — INTERNAL MED PROGRESS NOTE
Subjective


Date of Service:  Feb 10, 2018


Physician Name


Leah Lopes


Attending Physician


Dutsin Fish MD





Current Medications








 Medications


  (Trade)  Dose


 Ordered  Sig/Tramaine


 Route


 PRN Reason  Start Time


 Stop Time Status Last Admin


Dose Admin


 


 Acetaminophen


  (Tylenol)  650 mg  Q4H  PRN


 ORAL


 fever  2/9/18 06:15


 3/11/18 06:14   


 


 


 Acetaminophen


  (Tylenol)  650 mg  Q4H  PRN


 ORAL


 For Pain  2/9/18 13:00


 3/11/18 12:59   


 


 


 Al Hydroxide/Mg


 Hydroxide


  (Mylanta II)  30 ml  Q6H  PRN


 ORAL


 dyspepsia  2/9/18 06:15


 3/11/18 06:14   


 


 


 Allopurinol


  (Zyloprim)  100 mg  BID


 ORAL


   2/9/18 09:00


 3/11/18 08:59  2/10/18 09:11


 


 


 Amlodipine


 Besylate


  (Norvasc)  5 mg  DAILY


 ORAL


   2/9/18 09:00


 3/11/18 08:59  2/10/18 09:12


 


 


 Atenolol


  (Tenormin)  50 mg  DAILY


 ORAL


   2/9/18 09:00


 3/11/18 08:59  2/10/18 09:12


 


 


 Dextrose


  (Dextrose 50%)    STAT  PRN


 IV


 Hypoglycemia  2/9/18 06:15


 3/11/18 06:14   


 


 


 Diphenhydramine


 HCl


  (Benadryl)  25 mg  Q6H  PRN


 ORAL


 Itching/Pruritis  2/9/18 06:15


 3/11/18 06:14   


 


 


 Duloxetine HCl


  (Cymbalta)  30 mg  DAILY


 ORAL


   2/9/18 09:00


 3/11/18 08:59  2/10/18 09:12


 


 


 Hydralazine HCl


  (Apresoline)  20 mg  BID


 ORAL


   2/9/18 09:00


 3/11/18 08:59  2/10/18 09:12


 


 


 Insulin Aspart


  (NovoLOG)    BEFORE MEALS AND  HS


 SUBQ


   2/9/18 06:30


 3/11/18 06:29  2/10/18 17:11


 


 


 Levofloxacin  50 ml @ 50


 mls/hr  DAILY@1400


 IVPB


   2/11/18 14:00


 2/18/18 13:59   


 


 


 Levothyroxine


 Sodium


  (Synthroid)  75 mcg  DAILY@0630


 ORAL


   2/9/18 06:30


 3/11/18 06:29  2/10/18 06:31


 


 


 Nitroglycerin


  (Ntg)  0.4 mg  Q5M X 3 DOSES PRN


 SL


 Prn Chest Pain  2/9/18 06:15


 3/11/18 06:14   


 


 


 Ondansetron HCl


  (Zofran)  4 mg  Q6H  PRN


 IVP


 Nausea & Vomiting  2/9/18 06:15


 3/11/18 06:14   


 


 


 Polyethylene


 Glycol


  (Miralax)  17 gm  HSPRN  PRN


 ORAL


 Constipation  2/9/18 06:15


 3/11/18 06:14   


 


 


 Pregabalin


  (Lyrica)  50 mg  Q12HR


 ORAL


   2/9/18 09:00


 3/11/18 08:59  2/10/18 09:11


 


 


 Sodium Chloride  1,000 ml @ 


 100 mls/hr  Q10H


 IV


   2/10/18 12:00


 3/12/18 11:59  2/10/18 12:05


 


 


 Tamsulosin HCl


  (Flomax)  0.4 mg  BEDTIME


 ORAL


   2/10/18 21:00


 3/12/18 20:59   


 


 


 Temazepam


  (Restoril)  15 mg  HSPRN  PRN


 ORAL


 Insomnia  2/9/18 06:15


 2/16/18 06:14  2/9/18 22:34


 








Allergies:  


Coded Allergies:  


     MORPHINE (Verified  Allergy, Unknown, 2/9/18)


ROS Limited/Unobtainable:  No


Constitutional:  Reports: no symptoms


HEENT:  Reports: no symptoms


Cardiovascular:  Reports: no symptoms


Respiratory:  Reports: no symptoms


Gastrointestinal/Abdominal:  Reports: no symptoms


Genitourinary:  Reports: no symptoms


Neurologic/Psychiatric:  Reports: no symptoms


Subjective


83 YO M admitted with diarrhea and dehydration.  Now renal failure.  Cover for 

Int Robin-Dr Fish





Objective





Last Vital Signs








  Date Time  Temp Pulse Resp B/P (MAP) Pulse Ox O2 Delivery O2 Flow Rate FiO2


 


2/10/18 15:44 98.0 63 20 162/68 95   


 


2/10/18 11:56      Room Air  








General Appearance:  WD/WN, no apparent distress, alert


EENT:  PERRL/EOMI, normal ENT inspection


Neck:  non-tender, normal alignment, supple, normal inspection


Cardiovascular:  normal peripheral pulses, normal rate, regular rhythm, no 

gallop/murmur, no JVD


Respiratory/Chest:  chest wall non-tender, lungs clear, normal breath sounds, 

no respiratory distress, no accessory muscle use


Abdomen:  normal bowel sounds, non tender, soft, no organomegaly, no mass


Extremities:  normal range of motion


Neurologic:  CNs II-XII grossly normal, no motor/sensory deficits


Skin:  normal pigmentation, warm/dry





Laboratory Tests








Test


  2/10/18


07:35 2/10/18


07:40 2/10/18


09:30


 


Prothrombin Time


  10.9 SEC


(9.30-11.50) 


  


 


 


Prothromb Time International


Ratio 1.0 (0.9-1.1)  


  


  


 


 


Activated Partial


Thromboplast Time 37 SEC (23-33)


H 


  


 


 


White Blood Count


  


  7.7 K/UL


(4.8-10.8) 


 


 


Red Blood Count


  


  3.74 M/UL


(4.70-6.10)  L 


 


 


Hemoglobin


  


  12.1 G/DL


(14.2-18.0)  L 


 


 


Hematocrit


  


  35.4 %


(42.0-52.0)  L 


 


 


Mean Corpuscular Volume  95 FL (80-99)   


 


Mean Corpuscular Hemoglobin


  


  32.3 PG


(27.0-31.0)  H 


 


 


Mean Corpuscular Hemoglobin


Concent 


  34.2 G/DL


(32.0-36.0) 


 


 


Red Cell Distribution Width


  


  13.2 %


(11.6-14.8) 


 


 


Platelet Count


  


  152 K/UL


(150-450) 


 


 


Mean Platelet Volume


  


  9.1 FL


(6.5-10.1) 


 


 


Neutrophils (%) (Auto)


  


  64.2 %


(45.0-75.0) 


 


 


Lymphocytes (%) (Auto)


  


  20.1 %


(20.0-45.0) 


 


 


Monocytes (%) (Auto)


  


  11.7 %


(1.0-10.0)  H 


 


 


Eosinophils (%) (Auto)


  


  3.4 %


(0.0-3.0)  H 


 


 


Basophils (%) (Auto)


  


  0.7 %


(0.0-2.0) 


 


 


Erythrocyte Sedimentation Rate


  


  41 MM/HR


(0-30)  H 


 


 


Reticulocyte Count


  


  1.3 %


(0.0-2.0) 


 


 


Sodium Level


  


  135 MMOL/L


(136-145)  L 


 


 


Potassium Level


  


  4.1 MMOL/L


(3.5-5.1) 


 


 


Chloride Level


  


  104 MMOL/L


() 


 


 


Carbon Dioxide Level


  


  26 MMOL/L


(21-32) 


 


 


Anion Gap


  


  6 mmol/L


(5-15) 


 


 


Blood Urea Nitrogen


  


  26 mg/dL


(7-18)  H 


 


 


Creatinine


  


  1.8 MG/DL


(0.55-1.30)  H 


 


 


Estimat Glomerular Filtration


Rate 


   mL/min (>60)  


  


 


 


Glucose Level


  


  90 MG/DL


() 


 


 


Hemoglobin A1c


  


  7.0 %


(4.3-6.0)  H 


 


 


Calcium Level


  


  9.1 MG/DL


(8.5-10.1) 


 


 


Phosphorus Level


  


  2.9 MG/DL


(2.5-4.9) 


 


 


Magnesium Level


  


  2.1 MG/DL


(1.8-2.4) 


 


 


Iron Level


  


  22 ug/dL


()  L 


 


 


Total Iron Binding Capacity


  


  227 ug/dL


(250-450)  L 


 


 


Percent Iron Saturation  10 % (15-50)  L 


 


Unsaturated Iron Binding


  


  205 ug/dL


(112-346) 


 


 


Ferritin


  


  126 NG/ML


(8-388) 


 


 


Total Bilirubin


  


  0.5 MG/DL


(0.2-1.0) 


 


 


Aspartate Amino Transf


(AST/SGOT) 


  13 U/L (15-37)


L 


 


 


Alanine Aminotransferase


(ALT/SGPT) 


  20 U/L (12-78)


  


 


 


Alkaline Phosphatase


  


  56 U/L


() 


 


 


Total Protein


  


  6.0 G/DL


(6.4-8.2)  L 


 


 


Albumin


  


  2.9 G/DL


(3.4-5.0)  L 


 


 


Globulin  3.1 g/dL   


 


Albumin/Globulin Ratio


  


  0.9 (1.0-2.7)


L 


 


 


Amylase Level


  


  71 U/L


() 


 


 


Lipase


  


  86 U/L


() 


 


 


Vitamin B12 Level


  


  519 PG/ML


(193-986) 


 


 


Folate


  


  14.5 NG/ML


(8.6-58.9) 


 


 


Thyroid Stimulating Hormone


(TSH) 


  1.990 uiU/mL


(0.358-3.740) 


 


 


Free Thyroxine


  


  0.99 NG/DL


(0.76-1.46) 


 


 


Urine Color   Pale yellow  


 


Urine Appearance


  


  


  Slightly


cloudy


 


Urine pH   6 (4.5-8.0)  


 


Urine Specific Gravity


  


  


  1.015


(1.005-1.035)


 


Urine Protein


  


  


  3+ (NEGATIVE)


H


 


Urine Glucose (UA)


  


  


  Negative


(NEGATIVE)


 


Urine Ketones


  


  


  Negative


(NEGATIVE)


 


Urine Occult Blood


  


  


  4+ (NEGATIVE)


H


 


Urine Nitrite


  


  


  Negative


(NEGATIVE)


 


Urine Bilirubin


  


  


  Negative


(NEGATIVE)


 


Urine Urobilinogen


  


  


  Normal MG/DL


(0.0-1.0)


 


Urine Leukocyte Esterase


  


  


  3+ (NEGATIVE)


H


 


Urine RBC


  


  


  Tntc /HPF (0 -


0)  H


 


Urine WBC


  


  


  Tntc /HPF (0 -


0)  H


 


Urine Squamous Epithelial


Cells 


  


  Few /LPF


(NONE/OCC)


 


Urine Bacteria


  


  


  Few /HPF


(NONE)


 


Urine Eosinophils   None seen  


 


Urine Random Sodium


  


  


  102 MEQ/L


()


 


Urine Potassium Timed


  


  


  23 mmol/L


(12-62)











Microbiology








 Date/Time


Source Procedure


Growth Status


 


 


 2/9/18 09:30


Stool Clostridium difficile Toxin Assay - Final Complete


 


 2/9/18 06:00


Urine,Clean Catch Urine Culture - Preliminary


NO GROWTH Resulted

















Intake and Output  


 


 2/9/18 2/10/18





 19:00 07:00


 


Intake Total  240 ml


 


Output Total 250 ml 450 ml


 


Balance -250 ml -210 ml


 


  


 


Intake Oral  240 ml


 


Output Urine Total 250 ml 450 ml


 


# Voids 1 


 


# Bowel Movements 2 2











Assessment/Plan


Problem List:  


(1) Renal failure


Assessment & Plan:  Due to dehydration





(2) Near syncope


Assessment & Plan:  Due to hypotension due to dehydration





(3) Diverticulosis


(4) HTN (hypertension)


Assessment & Plan:  Cont norvasc, atenolol and hydralazine





(5) Diabetes mellitus, type II


(6) Hypothyroidism


Assessment & Plan:  Continue synthroid





(7) Hypercholesteremia


(8) Depression


(9) Protracted diarrhea


(10) BPH (benign prostatic hyperplasia)


Assessment & Plan:  Await urology consult.





(11) Dehydration


Assessment & Plan:  Continue IV fluids.





(12) Abdominal aortic aneurysm (AAA) 3.0 cm to 5.5 cm in diameter in male


Assessment & Plan:  Await vascular surgery consult.





Status:  not improved











LEAH LOPES Feb 10, 2018 17:37

## 2018-02-10 NOTE — DIAGNOSTIC IMAGING REPORT
Indication: Abdominal pain

 

Technique: Multiplanar Grayscale and color Doppler imaging of the abdomen.

Multiplanar grayscale and color Doppler imaging of the Kidneys and bladder.

 

Comparison: CT abdomen 219

 

Findings: 

Limited exam due to body habitus.

 

Imaged portions of the pancreatic head unremarkable in appearance. Body and tail are

not seen.

 

Liver is enlarged with the right lobe measuring 17 cm in length. Hepatic echogenicity

is homogeneous. No focal hepatic mass lesion is appreciated sonographically.

Gallbladder is normal in appearance. No pericholecystic fluid or gallstones seen.

Sonographic Austin sign reported as negative. Common bile duct measures 2.9 cm.

 

Multiple well-circumscribed structures in the kidneys are noted. Many of them

demonstrate characteristics consistent with simple renal cysts. Conglomerate lesion

in the mid/upper pole the right kidney measuring 4.9 x 4.4 cm is noted and not

definitively a simple renal cyst.. There is no hydronephrosis bilaterally. Prostate

is enlarged. Bladder volume of approximately 207 mL.

 

Aorta not well seen. Spleen normal in size. No ascites

 

IMPRESSION: 

Limited exam.

 

Multiple bilateral simple appearing renal cysts. Conglomerate lesion in the upper

pole the right kidney measuring 4.9 x 4.4 cm not definitively cystic in nature.

Correlation with contrast-enhanced CT or MRI recommended to exclude the possibility

of malignancy.

 

Mild hepatomegaly.

 

No gallstones or evidence to suggest acute cholecystitis.

 

Mildly enlarged prostate.

## 2018-02-10 NOTE — GENERAL PROGRESS NOTE
Assessment/Plan


Assessment/Plan


Assessment


(1) Colitis, acute


(2) Protracted diarrhea


(3) Dehydration





Plan


recommend colonoscopy, patient refuses at this time and request his POC be 

discussed with his primary care 


adv to renal diet after imaging studies >> renal/abdominal U/S


electrolyte correction


send for stool studies, cdiff


anemia work up


OB stool r/o GI bleed


monitor H&H, prn transfusions


ppi


fu labs





Subjective


Allergies:  


Coded Allergies:  


     MORPHINE (Verified  Allergy, Unknown, 2/9/18)


Subjective


Feels OK


d/w family at bedside


eating OK


still with diarrhea





Objective





Last 24 Hour Vital Signs








  Date Time  Temp Pulse Resp B/P (MAP) Pulse Ox O2 Delivery O2 Flow Rate FiO2


 


2/10/18 15:44 98.0 63 20 162/68 95   


 


2/10/18 11:56 97.1 98 18 148/66 95 Room Air  


 


2/10/18 09:12    161/68    


 


2/10/18 09:12  63  161/68    


 


2/10/18 09:12  63  161/68    


 


2/10/18 08:20 98.1 63 19 161/68  Room Air  


 


2/9/18 17:44    136/70    

















Intake and Output  


 


 2/9/18 2/10/18





 19:00 07:00


 


Intake Total  240 ml


 


Output Total 250 ml 450 ml


 


Balance -250 ml -210 ml


 


  


 


Intake Oral  240 ml


 


Output Urine Total 250 ml 450 ml


 


# Voids 1 


 


# Bowel Movements 2 2








Laboratory Tests


2/10/18 07:35: 


Prothrombin Time 10.9, Prothromb Time International Ratio 1.0, Activated 

Partial Thromboplast Time 37H


2/10/18 07:40: 


White Blood Count 7.7, Red Blood Count 3.74L, Hemoglobin 12.1L, Hematocrit 35.4L

, Mean Corpuscular Volume 95, Mean Corpuscular Hemoglobin 32.3H, Mean 

Corpuscular Hemoglobin Concent 34.2, Red Cell Distribution Width 13.2, Platelet 

Count 152, Mean Platelet Volume 9.1, Neutrophils (%) (Auto) 64.2, Lymphocytes (%

) (Auto) 20.1, Monocytes (%) (Auto) 11.7H, Eosinophils (%) (Auto) 3.4H, 

Basophils (%) (Auto) 0.7, Erythrocyte Sedimentation Rate 41H, Reticulocyte 

Count 1.3, Sodium Level 135L, Potassium Level 4.1, Chloride Level 104, Carbon 

Dioxide Level 26, Anion Gap 6, Blood Urea Nitrogen 26H, Creatinine 1.8H, 

Estimat Glomerular Filtration Rate , Glucose Level 90, Hemoglobin A1c 7.0H, 

Calcium Level 9.1, Phosphorus Level 2.9, Magnesium Level 2.1, Iron Level 22L, 

Total Iron Binding Capacity 227L, Percent Iron Saturation 10L, Unsaturated Iron 

Binding 205, Ferritin 126, Total Bilirubin 0.5, Aspartate Amino Transf (AST/SGOT

) 13L, Alanine Aminotransferase (ALT/SGPT) 20, Alkaline Phosphatase 56, Total 

Protein 6.0L, Albumin 2.9L, Globulin 3.1, Albumin/Globulin Ratio 0.9L, Amylase 

Level 71, Lipase 86, Vitamin B12 Level 519, Folate 14.5, Thyroid Stimulating 

Hormone (TSH) 1.990, Free Thyroxine 0.99


2/10/18 09:30: 


Urine Color Pale yellow, Urine Appearance Slightly cloudy, Urine pH 6, Urine 

Specific Gravity 1.015, Urine Protein 3+H, Urine Glucose (UA) Negative, Urine 

Ketones Negative, Urine Occult Blood 4+H, Urine Nitrite Negative, Urine 

Bilirubin Negative, Urine Urobilinogen Normal, Urine Leukocyte Esterase 3+H, 

Urine RBC TntcH, Urine WBC TntcH, Urine Squamous Epithelial Cells Few, Urine 

Bacteria Few, Urine Eosinophils None seen, Urine Random Sodium 102, Urine 

Potassium Timed 23


Height (Feet):  5


Height (Inches):  6.00


Weight (Pounds):  171


Objective


Elderly WM


NCAT


supple


CTA


RRR


Soft ND


no edema











RICK LEROY Feb 10, 2018 17:20

## 2018-02-10 NOTE — PULMONOLOGY PROGRESS NOTE
Assessment/Plan


Problems:  


(1) Colitis, acute


(2) Severe anemia


(3) BPH (benign prostatic hyperplasia)


(4) JULISSA (acute kidney injury)


(5) Protracted diarrhea


(6) Dehydration


Assessment/Plan


iv fluids


iv abx


Urology called


GI evaluation


dvt prophylaxis


check stool





Subjective


ROS Limited/Unobtainable:  No


Constitutional:  Reports: no symptoms


HEENT:  Repors: no symptoms


Respiratory:  Reports: no symptoms


Allergies:  


Coded Allergies:  


     MORPHINE (Verified  Allergy, Unknown, 2/9/18)





Objective





Last 24 Hour Vital Signs








  Date Time  Temp Pulse Resp B/P (MAP) Pulse Ox O2 Delivery O2 Flow Rate FiO2


 


2/10/18 09:12    161/68    


 


2/10/18 09:12  63  161/68    


 


2/10/18 09:12  63  161/68    


 


2/10/18 08:20 98.1 63 19 161/68  Room Air  


 


2/9/18 17:44    136/70    


 


2/9/18 16:11 97.3 89 21 136/70 97 Room Air  

















Intake and Output  


 


 2/9/18 2/10/18





 19:00 07:00


 


Intake Total  240 ml


 


Output Total 250 ml 450 ml


 


Balance -250 ml -210 ml


 


  


 


Intake Oral  240 ml


 


Output Urine Total 250 ml 450 ml


 


# Voids 1 


 


# Bowel Movements 2 2








Objective


General Appearance:  WN/WD


HEENT:  normocephalic, atraumatic


Respiratory/Chest:  chest wall non-tender, normal breath sounds


Cardiovascular:  normal peripheral pulses, normal rate


Abdomen:  soft, non tender, no organomegaly


Genitourinary:  normal external genitalia


Extremities:  no clubbing


Skin:  no rash, no lesions


Neurologic/Psychiatric:  CNs II-XII grossly normal, 


Lymphatic:  no neck adenopathy





Microbiology








 Date/Time


Source Procedure


Growth Status


 


 


 2/9/18 09:30


Stool Clostridium difficile Toxin Assay - Final Complete


 


 2/9/18 06:00


Urine,Clean Catch Urine Culture - Preliminary


NO GROWTH Resulted








Laboratory Tests


2/10/18 07:35: 


Prothrombin Time 10.9, Prothromb Time International Ratio 1.0, Activated 

Partial Thromboplast Time 37H


2/10/18 07:40: 


White Blood Count 7.7, Red Blood Count 3.74L, Hemoglobin 12.1L, Hematocrit 35.4L

, Mean Corpuscular Volume 95, Mean Corpuscular Hemoglobin 32.3H, Mean 

Corpuscular Hemoglobin Concent 34.2, Red Cell Distribution Width 13.2, Platelet 

Count 152, Mean Platelet Volume 9.1, Neutrophils (%) (Auto) 64.2, Lymphocytes (%

) (Auto) 20.1, Monocytes (%) (Auto) 11.7H, Eosinophils (%) (Auto) 3.4H, 

Basophils (%) (Auto) 0.7, Erythrocyte Sedimentation Rate 41H, Reticulocyte 

Count [Pending], Sodium Level 135L, Potassium Level 4.1, Chloride Level 104, 

Carbon Dioxide Level 26, Anion Gap 6, Blood Urea Nitrogen 26H, Creatinine 1.8H, 

Estimat Glomerular Filtration Rate , Glucose Level 90, Hemoglobin A1c 7.0H, 

Calcium Level 9.1, Phosphorus Level 2.9, Magnesium Level 2.1, Iron Level 22L, 

Total Iron Binding Capacity 227L, Percent Iron Saturation 10L, Unsaturated Iron 

Binding 205, Ferritin 126, Total Bilirubin 0.5, Aspartate Amino Transf (AST/SGOT

) 13L, Alanine Aminotransferase (ALT/SGPT) 20, Alkaline Phosphatase 56, Total 

Protein 6.0L, Albumin 2.9L, Globulin 3.1, Albumin/Globulin Ratio 0.9L, Amylase 

Level 71, Lipase 86, Vitamin B12 Level 519, Folate 14.5, Thyroid Stimulating 

Hormone (TSH) 1.990, Free Thyroxine 0.99


2/10/18 09:30: 


Urine Color Pale yellow, Urine Appearance Slightly cloudy, Urine pH 6, Urine 

Specific Gravity 1.015, Urine Protein 3+H, Urine Glucose (UA) Negative, Urine 

Ketones Negative, Urine Occult Blood 4+H, Urine Nitrite Negative, Urine 

Bilirubin Negative, Urine Urobilinogen Normal, Urine Leukocyte Esterase 3+H, 

Urine RBC [Pending], Urine WBC [Pending], Urine Squamous Epithelial Cells [

Pending], Urine Bacteria [Pending], Urine Eosinophils [Pending], Urine Random 

Sodium [Pending], Urine Potassium Timed [Pending]





Current Medications








 Medications


  (Trade)  Dose


 Ordered  Sig/Tramaine


 Route


 PRN Reason  Start Time


 Stop Time Status Last Admin


Dose Admin


 


 Acetaminophen


  (Tylenol)  650 mg  Q4H  PRN


 ORAL


 fever  2/9/18 06:15


 3/11/18 06:14   


 


 


 Acetaminophen


  (Tylenol)  650 mg  Q4H  PRN


 ORAL


 For Pain  2/9/18 13:00


 3/11/18 12:59   


 


 


 Al Hydroxide/Mg


 Hydroxide


  (Mylanta II)  30 ml  Q6H  PRN


 ORAL


 dyspepsia  2/9/18 06:15


 3/11/18 06:14   


 


 


 Allopurinol


  (Zyloprim)  100 mg  BID


 ORAL


   2/9/18 09:00


 3/11/18 08:59  2/10/18 09:11


 


 


 Amlodipine


 Besylate


  (Norvasc)  5 mg  DAILY


 ORAL


   2/9/18 09:00


 3/11/18 08:59  2/10/18 09:12


 


 


 Atenolol


  (Tenormin)  50 mg  DAILY


 ORAL


   2/9/18 09:00


 3/11/18 08:59  2/10/18 09:12


 


 


 Dextrose


  (Dextrose 50%)    STAT  PRN


 IV


 Hypoglycemia  2/9/18 06:15


 3/11/18 06:14   


 


 


 Diphenhydramine


 HCl


  (Benadryl)  25 mg  Q6H  PRN


 ORAL


 Itching/Pruritis  2/9/18 06:15


 3/11/18 06:14   


 


 


 Duloxetine HCl


  (Cymbalta)  30 mg  DAILY


 ORAL


   2/9/18 09:00


 3/11/18 08:59  2/10/18 09:12


 


 


 Heparin Sodium


  (Porcine)


  (Heparin 5000


 units/ml)  5,000 units  EVERY 12  HOURS


 SUBQ


   2/9/18 09:00


 3/11/18 08:59   


 


 


 Hydralazine HCl


  (Apresoline)  20 mg  BID


 ORAL


   2/9/18 09:00


 3/11/18 08:59  2/10/18 09:12


 


 


 Insulin Aspart


  (NovoLOG)    BEFORE MEALS AND  HS


 SUBQ


   2/9/18 06:30


 3/11/18 06:29  2/9/18 21:25


 


 


 Levothyroxine


 Sodium


  (Synthroid)  75 mcg  DAILY@0630


 ORAL


   2/9/18 06:30


 3/11/18 06:29  2/10/18 06:31


 


 


 Nitroglycerin


  (Ntg)  0.4 mg  Q5M X 3 DOSES PRN


 SL


 Prn Chest Pain  2/9/18 06:15


 3/11/18 06:14   


 


 


 Ondansetron HCl


  (Zofran)  4 mg  Q6H  PRN


 IVP


 Nausea & Vomiting  2/9/18 06:15


 3/11/18 06:14   


 


 


 Polyethylene


 Glycol


  (Miralax)  17 gm  HSPRN  PRN


 ORAL


 Constipation  2/9/18 06:15


 3/11/18 06:14   


 


 


 Pregabalin


  (Lyrica)  50 mg  Q12HR


 ORAL


   2/9/18 09:00


 3/11/18 08:59  2/10/18 09:11


 


 


 Sodium Chloride  1,000 ml @ 


 50 mls/hr  Q20H


 IV


   2/9/18 06:03


 3/11/18 06:02  2/10/18 02:40


 


 


 Temazepam


  (Restoril)  15 mg  HSPRN  PRN


 ORAL


 Insomnia  2/9/18 06:15


 2/16/18 06:14  2/9/18 22:34


 

















ADRIENNE CUNNINGHAM Feb 10, 2018 11:40

## 2018-02-11 VITALS — DIASTOLIC BLOOD PRESSURE: 71 MMHG | SYSTOLIC BLOOD PRESSURE: 129 MMHG

## 2018-02-11 VITALS — DIASTOLIC BLOOD PRESSURE: 69 MMHG | SYSTOLIC BLOOD PRESSURE: 182 MMHG

## 2018-02-11 VITALS — DIASTOLIC BLOOD PRESSURE: 75 MMHG | SYSTOLIC BLOOD PRESSURE: 168 MMHG

## 2018-02-11 VITALS — DIASTOLIC BLOOD PRESSURE: 74 MMHG | SYSTOLIC BLOOD PRESSURE: 174 MMHG

## 2018-02-11 VITALS — SYSTOLIC BLOOD PRESSURE: 174 MMHG | DIASTOLIC BLOOD PRESSURE: 86 MMHG

## 2018-02-11 VITALS — DIASTOLIC BLOOD PRESSURE: 91 MMHG | SYSTOLIC BLOOD PRESSURE: 127 MMHG

## 2018-02-11 VITALS — DIASTOLIC BLOOD PRESSURE: 71 MMHG | SYSTOLIC BLOOD PRESSURE: 152 MMHG

## 2018-02-11 VITALS — SYSTOLIC BLOOD PRESSURE: 159 MMHG | DIASTOLIC BLOOD PRESSURE: 73 MMHG

## 2018-02-11 VITALS — SYSTOLIC BLOOD PRESSURE: 146 MMHG | DIASTOLIC BLOOD PRESSURE: 70 MMHG

## 2018-02-11 LAB
ADD MANUAL DIFF: NO
ALBUMIN SERPL-MCNC: 2.7 G/DL (ref 3.4–5)
ALBUMIN/GLOB SERPL: 0.8 {RATIO} (ref 1–2.7)
ALP SERPL-CCNC: 55 U/L (ref 46–116)
ALT SERPL-CCNC: 19 U/L (ref 12–78)
ANION GAP SERPL CALC-SCNC: 4 MMOL/L (ref 5–15)
AST SERPL-CCNC: 14 U/L (ref 15–37)
BASOPHILS NFR BLD AUTO: 1.4 % (ref 0–2)
BILIRUB SERPL-MCNC: 0.3 MG/DL (ref 0.2–1)
BUN SERPL-MCNC: 23 MG/DL (ref 7–18)
CALCIUM SERPL-MCNC: 8.9 MG/DL (ref 8.5–10.1)
CHLORIDE SERPL-SCNC: 104 MMOL/L (ref 98–107)
CO2 SERPL-SCNC: 26 MMOL/L (ref 21–32)
CREAT SERPL-MCNC: 1.4 MG/DL (ref 0.55–1.3)
EOSINOPHIL NFR BLD AUTO: 4.2 % (ref 0–3)
ERYTHROCYTE [DISTWIDTH] IN BLOOD BY AUTOMATED COUNT: 13.2 % (ref 11.6–14.8)
GLOBULIN SER-MCNC: 3.3 G/DL
HCT VFR BLD CALC: 35.2 % (ref 42–52)
HGB BLD-MCNC: 12.2 G/DL (ref 14.2–18)
LYMPHOCYTES NFR BLD AUTO: 23.1 % (ref 20–45)
MCV RBC AUTO: 94 FL (ref 80–99)
MONOCYTES NFR BLD AUTO: 10.1 % (ref 1–10)
NEUTROPHILS NFR BLD AUTO: 61.2 % (ref 45–75)
PHOSPHATE SERPL-MCNC: 2.6 MG/DL (ref 2.5–4.9)
PLATELET # BLD: 152 K/UL (ref 150–450)
POTASSIUM SERPL-SCNC: 4 MMOL/L (ref 3.5–5.1)
RBC # BLD AUTO: 3.76 M/UL (ref 4.7–6.1)
SODIUM SERPL-SCNC: 134 MMOL/L (ref 136–145)
WBC # BLD AUTO: 6.5 K/UL (ref 4.8–10.8)

## 2018-02-11 RX ADMIN — INSULIN ASPART SCH UNITS: 100 INJECTION, SOLUTION INTRAVENOUS; SUBCUTANEOUS at 06:26

## 2018-02-11 RX ADMIN — DOCUSATE SODIUM SCH MG: 100 CAPSULE, LIQUID FILLED ORAL at 16:57

## 2018-02-11 RX ADMIN — TAMSULOSIN HYDROCHLORIDE SCH MG: 0.4 CAPSULE ORAL at 20:44

## 2018-02-11 RX ADMIN — INSULIN ASPART SCH UNITS: 100 INJECTION, SOLUTION INTRAVENOUS; SUBCUTANEOUS at 16:59

## 2018-02-11 RX ADMIN — HYDRALAZINE HYDROCHLORIDE SCH MG: 10 TABLET ORAL at 09:14

## 2018-02-11 RX ADMIN — PREGABALIN SCH MG: 50 CAPSULE ORAL at 20:44

## 2018-02-11 RX ADMIN — Medication SCH TAB: at 10:23

## 2018-02-11 RX ADMIN — DULOXETINE HYDROCHLORIDE SCH MG: 30 CAPSULE, DELAYED RELEASE ORAL at 09:06

## 2018-02-11 RX ADMIN — DOCUSATE SODIUM SCH MG: 100 CAPSULE, LIQUID FILLED ORAL at 12:14

## 2018-02-11 RX ADMIN — ALLOPURINOL SCH MG: 100 TABLET ORAL at 16:57

## 2018-02-11 RX ADMIN — LACTULOSE SCH GM: 20 SOLUTION ORAL at 16:57

## 2018-02-11 RX ADMIN — LACTULOSE SCH GM: 20 SOLUTION ORAL at 12:15

## 2018-02-11 RX ADMIN — HYDRALAZINE HYDROCHLORIDE SCH MG: 10 TABLET ORAL at 16:57

## 2018-02-11 RX ADMIN — ALLOPURINOL SCH MG: 100 TABLET ORAL at 09:05

## 2018-02-11 RX ADMIN — PREGABALIN SCH MG: 50 CAPSULE ORAL at 09:06

## 2018-02-11 RX ADMIN — INSULIN ASPART SCH UNITS: 100 INJECTION, SOLUTION INTRAVENOUS; SUBCUTANEOUS at 12:14

## 2018-02-11 RX ADMIN — INSULIN ASPART SCH UNITS: 100 INJECTION, SOLUTION INTRAVENOUS; SUBCUTANEOUS at 20:55

## 2018-02-11 NOTE — PULMONOLOGY PROGRESS NOTE
Assessment/Plan


Problems:  


(1) Colitis, acute


(2) Severe anemia


(3) BPH (benign prostatic hyperplasia)


(4) JULISSA (acute kidney injury)


(5) Protracted diarrhea


(6) Lower GI bleed


(7) Constipated


(8) UTI (urinary tract infection)


(9) Abdominal aortic aneurysm (AAA) 3.0 cm to 5.5 cm in diameter in male


(10) Dehydration


Assessment/Plan


iv fluids


iv abx


Urology called


GI evaluation


dvt prophylaxis


check stool


continue abx





Subjective


ROS Limited/Unobtainable:  No


HEENT:  Repors: no symptoms


Allergies:  


Coded Allergies:  


     MORPHINE (Verified  Allergy, Unknown, 2/9/18)





Objective





Last 24 Hour Vital Signs








  Date Time  Temp Pulse Resp B/P (MAP) Pulse Ox O2 Delivery O2 Flow Rate FiO2


 


2/11/18 09:15  58  163/66    


 


2/11/18 09:14    168/75    


 


2/11/18 08:00 98.0 59 20 168/75 98   


 


2/11/18 04:00 97.9 55 20 159/73 97 Room Air  


 


2/11/18 00:00 98.0 79 18 129/71 95 Room Air  


 


2/10/18 20:00 98.4 82 18 109/69 95 Room Air  


 


2/10/18 18:08    162/68    


 


2/10/18 15:44 98.0 63 20 162/68 95   


 


2/10/18 15:44      Room Air  


 


2/10/18 11:56 97.1 98 18 148/66 95 Room Air  

















Intake and Output  


 


 2/10/18 2/11/18





 19:00 07:00


 


Intake Total 985 ml 1500 ml


 


Output Total  3100 ml


 


Balance 985 ml -1600 ml


 


  


 


Intake Oral 285 ml 500 ml


 


IV Total 700 ml 1000 ml


 


Output Urine Total  3100 ml








Objective


General Appearance:  WN/WD


HEENT:  normocephalic, atraumatic


Respiratory/Chest:  chest wall non-tender, normal breath sounds


Cardiovascular:  normal peripheral pulses, normal rate


Abdomen:  soft, non tender, no organomegaly


Genitourinary:  normal external genitalia


Extremities:  no clubbing


Skin:  no rash, no lesions


Neurologic/Psychiatric:  CNs II-XII grossly normal, 


Lymphatic:  no neck adenopathy





Microbiology








 Date/Time


Source Procedure


Growth Status


 


 


 2/9/18 09:30


Stool Clostridium difficile Toxin Assay - Final Complete


 


 2/9/18 06:00


Urine,Clean Catch Urine Culture - Preliminary


Mixed Gram Positive Organism Resulted








Laboratory Tests


2/11/18 07:18: 


White Blood Count 6.5, Red Blood Count 3.76L, Hemoglobin 12.2L, Hematocrit 35.2L

, Mean Corpuscular Volume 94, Mean Corpuscular Hemoglobin 32.6H, Mean 

Corpuscular Hemoglobin Concent 34.8, Red Cell Distribution Width 13.2, Platelet 

Count 152, Mean Platelet Volume 9.2, Neutrophils (%) (Auto) 61.2, Lymphocytes (%

) (Auto) 23.1, Monocytes (%) (Auto) 10.1H, Eosinophils (%) (Auto) 4.2H, 

Basophils (%) (Auto) 1.4, Sodium Level 134L, Potassium Level 4.0, Chloride 

Level 104, Carbon Dioxide Level 26, Anion Gap 4L, Blood Urea Nitrogen 23H, 

Creatinine 1.4H, Estimat Glomerular Filtration Rate , Glucose Level 108H, 

Calcium Level 8.9, Phosphorus Level 2.6, Magnesium Level 1.8, Total Bilirubin 

0.3, Aspartate Amino Transf (AST/SGOT) 14L, Alanine Aminotransferase (ALT/SGPT) 

19, Alkaline Phosphatase 55, Total Protein 6.0L, Albumin 2.7L, Globulin 3.3, 

Albumin/Globulin Ratio 0.8L





Current Medications








 Medications


  (Trade)  Dose


 Ordered  Sig/Tramaine


 Route


 PRN Reason  Start Time


 Stop Time Status Last Admin


Dose Admin


 


 Acetaminophen


  (Tylenol)  650 mg  Q4H  PRN


 ORAL


 fever  2/9/18 06:15


 3/11/18 06:14   


 


 


 Acetaminophen


  (Tylenol)  650 mg  Q4H  PRN


 ORAL


 For Pain  2/9/18 13:00


 3/11/18 12:59   


 


 


 Al Hydroxide/Mg


 Hydroxide


  (Mylanta II)  30 ml  Q6H  PRN


 ORAL


 dyspepsia  2/9/18 06:15


 3/11/18 06:14   


 


 


 Allopurinol


  (Zyloprim)  100 mg  BID


 ORAL


   2/9/18 09:00


 3/11/18 08:59  2/11/18 09:05


 


 


 Amlodipine


 Besylate


  (Norvasc)  5 mg  DAILY


 ORAL


   2/9/18 09:00


 3/11/18 08:59  2/11/18 09:15


 


 


 Atenolol


  (Tenormin)  50 mg  DAILY


 ORAL


   2/9/18 09:00


 3/11/18 08:59  2/10/18 09:12


 


 


 Dextrose


  (Dextrose 50%)    STAT  PRN


 IV


 Hypoglycemia  2/9/18 06:15


 3/11/18 06:14   


 


 


 Diphenhydramine


 HCl


  (Benadryl)  25 mg  Q6H  PRN


 ORAL


 Itching/Pruritis  2/9/18 06:15


 3/11/18 06:14   


 


 


 Duloxetine HCl


  (Cymbalta)  30 mg  DAILY


 ORAL


   2/9/18 09:00


 3/11/18 08:59  2/11/18 09:06


 


 


 Hydralazine HCl


  (Apresoline)  20 mg  BID


 ORAL


   2/9/18 09:00


 3/11/18 08:59  2/11/18 09:14


 


 


 Insulin Aspart


  (NovoLOG)    BEFORE MEALS AND  HS


 SUBQ


   2/9/18 06:30


 3/11/18 06:29  2/11/18 06:26


 


 


 Levofloxacin  50 ml @ 50


 mls/hr  DAILY@1400


 IVPB


   2/11/18 14:00


 2/18/18 13:59   


 


 


 Levothyroxine


 Sodium


  (Synthroid)  75 mcg  DAILY@0630


 ORAL


   2/9/18 06:30


 3/11/18 06:29  2/11/18 06:25


 


 


 Nitroglycerin


  (Ntg)  0.4 mg  Q5M X 3 DOSES PRN


 SL


 Prn Chest Pain  2/9/18 06:15


 3/11/18 06:14   


 


 


 Ondansetron HCl


  (Zofran)  4 mg  Q6H  PRN


 IVP


 Nausea & Vomiting  2/9/18 06:15


 3/11/18 06:14   


 


 


 Polyethylene


 Glycol


  (Miralax)  17 gm  HSPRN  PRN


 ORAL


 Constipation  2/9/18 06:15


 3/11/18 06:14   


 


 


 Pregabalin


  (Lyrica)  50 mg  Q12HR


 ORAL


   2/9/18 09:00


 3/11/18 08:59  2/11/18 09:06


 


 


 Sodium Chloride  1,000 ml @ 


 100 mls/hr  Q10H


 IV


   2/10/18 12:00


 3/12/18 11:59  2/11/18 09:04


 


 


 Tamsulosin HCl


  (Flomax)  0.4 mg  BEDTIME


 ORAL


   2/10/18 21:00


 3/12/18 20:59  2/10/18 20:19


 


 


 Temazepam


  (Restoril)  15 mg  HSPRN  PRN


 ORAL


 Insomnia  2/9/18 06:15


 2/16/18 06:14  2/10/18 20:19


 

















ADRIENNE CUNNINGHAM Feb 11, 2018 09:32

## 2018-02-11 NOTE — CONSULTATION
Consult Note


Consult Note


ID DIC #    [id number]











GIOVANY SHIPMAN M.D. Feb 11, 2018 13:17

## 2018-02-11 NOTE — GENERAL PROGRESS NOTE
Assessment/Plan


Assessment/Plan


Assessment


(1) Colitis, acute - resolved


(2) Protracted diarrhea - resolved


(3) Dehydration - improved





Plan


previously recommend colonoscopy, patient refuses at this time and request his 

POC be discussed with his primary care 


adv to renal diet after imaging studies >> renal/abdominal U/S


electrolyte correction


send for stool studies, cdiff


anemia work up


OB stool r/o GI bleed


monitor H&H, prn transfusions


ppi


fu labs





Subjective


Allergies:  


Coded Allergies:  


     MORPHINE (Verified  Allergy, Unknown, 2/9/18)


Subjective


Feels OK


d/w family at bedside


eating OK


diarrhea resolved





Objective





Last 24 Hour Vital Signs








  Date Time  Temp Pulse Resp B/P (MAP) Pulse Ox O2 Delivery O2 Flow Rate FiO2


 


2/11/18 16:57    146/70    


 


2/11/18 16:00 98.4 64 18 146/70 99   


 


2/11/18 12:00 97.8 86 18 127/91 99   


 


2/11/18 09:15  58  163/66    


 


2/11/18 09:14    168/75    


 


2/11/18 08:00 98.0 59 20 168/75 98   


 


2/11/18 04:00 97.9 55 20 159/73 97 Room Air  


 


2/11/18 00:00 98.0 79 18 129/71 95 Room Air  


 


2/10/18 20:00 98.4 82 18 109/69 95 Room Air  

















Intake and Output  


 


 2/10/18 2/11/18





 19:00 07:00


 


Intake Total 985 ml 1500 ml


 


Output Total  3100 ml


 


Balance 985 ml -1600 ml


 


  


 


Intake Oral 285 ml 500 ml


 


IV Total 700 ml 1000 ml


 


Output Urine Total  3100 ml








Laboratory Tests


2/11/18 07:18: 


White Blood Count 6.5, Red Blood Count 3.76L, Hemoglobin 12.2L, Hematocrit 35.2L

, Mean Corpuscular Volume 94, Mean Corpuscular Hemoglobin 32.6H, Mean 

Corpuscular Hemoglobin Concent 34.8, Red Cell Distribution Width 13.2, Platelet 

Count 152, Mean Platelet Volume 9.2, Neutrophils (%) (Auto) 61.2, Lymphocytes (%

) (Auto) 23.1, Monocytes (%) (Auto) 10.1H, Eosinophils (%) (Auto) 4.2H, 

Basophils (%) (Auto) 1.4, Sodium Level 134L, Potassium Level 4.0, Chloride 

Level 104, Carbon Dioxide Level 26, Anion Gap 4L, Blood Urea Nitrogen 23H, 

Creatinine 1.4H, Estimat Glomerular Filtration Rate , Glucose Level 108H, 

Calcium Level 8.9, Phosphorus Level 2.6, Magnesium Level 1.8, Total Bilirubin 

0.3, Aspartate Amino Transf (AST/SGOT) 14L, Alanine Aminotransferase (ALT/SGPT) 

19, Alkaline Phosphatase 55, Total Protein 6.0L, Albumin 2.7L, Globulin 3.3, 

Albumin/Globulin Ratio 0.8L


Height (Feet):  5


Height (Inches):  6.00


Weight (Pounds):  171


Objective


Elderly WM


NCAT


supple


CTA


RRR


Soft ND


no edema











RICK LEROY Feb 11, 2018 19:08

## 2018-02-11 NOTE — CONSULTATION
DATE OF CONSULTATION:  02/11/2018



INFECTIOUS DISEASE CONSULTATION



CONSULTING PHYSICIAN:  Bhaskar Mcnulty M.D.



REQUESTING PHYSICIAN:  Petar Colbert M.D. and Dustin Fish M.D.



REASON FOR CONSULTATION:  Evaluation of the patient for possible urinary

tract infection, antibiotic management.



HISTORY OF PRESENT ILLNESS:  The patient is an 82-year-old male with

multiple medical problems, who presented to this medical center for

weakness, diarrhea, and dehydration.  The patient also has been

complaining of few episodes of hematuria prior to admission.  The patient

was found to have urinary retention and Gonzalez catheter was placed.  The

patient has been started on antibiotics and an Infectious Disease

consultation has been requested for further evaluation of the patient.

Currently, the patient's diarrhea has subsided, actually he is

constipated; however, the patient still has a Gonzalez catheter.



PAST MEDICAL HISTORY:

1. Hypertension.

2. Diabetes.

3. Hypothyroidism.

4. Hypercholesterolemia.

5. Depression.

6. BPH, status post TURP about three months ago.



MEDICATIONS:  Levaquin.



ALLERGIES:  Morphine.



SOCIAL HISTORY:  The patient lives with wife at home.



FAMILY HISTORY:  Not contributing.



REVIEW OF SYSTEMS:  A 10-point review was done and except what is mentioned

above has been negative.



PHYSICAL EXAMINATION:

VITAL SIGNS:  Temperature 97 degrees, pulse 80, blood pressure 127/91, and

respiratory rate 18.

HEENT:  No pale conjunctivae.  No icterus.

NECK:  No lymphadenopathy.

CHEST:  Clear.

HEART:  S1 and S2.

ABDOMEN:  Soft and nontender.

EXTREMITIES:  No cyanosis at this time.

GENITOURINARY:  Gonzalez catheter in place.

NEUROLOGIC:  Awake and alert.



LABORATORY AND DIAGNOSTIC DATA:  White blood cells 6.5, hemoglobin 12, and

platelets 152.  UA, too numerous to count white blood cells and too

numerous to count red blood cells.  BUN 23 and creatinine 1.4.  ALT, AST,

and alkaline phosphatase unremarkable.  Initial blood culture growing

mixed bacteria.  Repeat culture is pending.  Stool for C. difficile

negative.  Ultrasound of the abdomen, bilateral renal cysts, mild

hepatomegaly, no gallstone.  CT of the abdomen shows 5.1 cm saccular

infrarenal abdominal aortic aneurysm, and _____.



ASSESSMENT:  The patient is an 82-year-old male with,



1. Status post diarrhea, that has resolved.

2. Hematuria, probable underlying urinary tract infection in view of

urinary retention.

3. Urinary obstruction.

4. Afebrile.

5. Normal white blood cells.



PLAN:

1. We will continue the patient on Levaquin day #1/14.

2. Monitor CBC.

3. Monitor BMP.

4. Monitor urine culture.

5. Monitor the patient's clinical course and laboratories and based on

those, we will do further recommendation.



Thank you, Dr. Colbert, for allowing me to participate in the care of

this patient.  I will follow the patient with you during this

hospitalization.









  ______________________________________________

  Bhaskar Mcnulty M.D.





DR:  JASON

D:  02/11/2018 13:17

T:  02/11/2018 18:01

JOB#:  [id number]

CC:

## 2018-02-11 NOTE — INTERNAL MED PROGRESS NOTE
Subjective


Physician Name


Leah Lopes


Attending Physician


Dustin Fish MD





Current Medications








 Medications


  (Trade)  Dose


 Ordered  Sig/Tramaine


 Route


 PRN Reason  Start Time


 Stop Time Status Last Admin


Dose Admin


 


 Acetaminophen


  (Tylenol)  650 mg  Q4H  PRN


 ORAL


 fever  2/9/18 06:15


 3/11/18 06:14   


 


 


 Acetaminophen


  (Tylenol)  650 mg  Q4H  PRN


 ORAL


 For Pain  2/9/18 13:00


 3/11/18 12:59   


 


 


 Al Hydroxide/Mg


 Hydroxide


  (Mylanta II)  30 ml  Q6H  PRN


 ORAL


 dyspepsia  2/9/18 06:15


 3/11/18 06:14   


 


 


 Allopurinol


  (Zyloprim)  100 mg  BID


 ORAL


   2/9/18 09:00


 3/11/18 08:59  2/11/18 16:57


 


 


 Amlodipine


 Besylate


  (Norvasc)  5 mg  DAILY


 ORAL


   2/9/18 09:00


 3/11/18 08:59  2/11/18 09:15


 


 


 Atenolol


  (Tenormin)  50 mg  DAILY


 ORAL


   2/9/18 09:00


 3/11/18 08:59  2/10/18 09:12


 


 


 Dextrose


  (Dextrose 50%)    STAT  PRN


 IV


 Hypoglycemia  2/9/18 06:15


 3/11/18 06:14   


 


 


 Diphenhydramine


 HCl


  (Benadryl)  25 mg  Q6H  PRN


 ORAL


 Itching/Pruritis  2/9/18 06:15


 3/11/18 06:14   


 


 


 Docusate Sodium


  (Colace)  100 mg  THREE TIMES A  DAY


 ORAL


   2/11/18 13:00


 3/13/18 12:59  2/11/18 16:57


 


 


 Duloxetine HCl


  (Cymbalta)  30 mg  DAILY


 ORAL


   2/9/18 09:00


 3/11/18 08:59  2/11/18 09:06


 


 


 Hydralazine HCl


  (Apresoline)  20 mg  BID


 ORAL


   2/9/18 09:00


 3/11/18 08:59  2/11/18 16:57


 


 


 Insulin Aspart


  (NovoLOG)    BEFORE MEALS AND  HS


 SUBQ


   2/9/18 06:30


 3/11/18 06:29  2/11/18 16:59


 


 


 Lactulose


  (Cephulac)  30 gm  THREE TIMES A  DAY


 ORAL


   2/11/18 13:00


 3/13/18 12:59  2/11/18 16:57


 


 


 Levofloxacin


  (Levaquin)  250 mg  DAILY


 ORAL


   2/11/18 12:00


 2/18/18 11:59  2/11/18 12:14


 


 


 Levothyroxine


 Sodium


  (Synthroid)  75 mcg  DAILY@0630


 ORAL


   2/9/18 06:30


 3/11/18 06:29  2/11/18 06:25


 


 


 Nitroglycerin


  (Ntg)  0.4 mg  Q5M X 3 DOSES PRN


 SL


 Prn Chest Pain  2/9/18 06:15


 3/11/18 06:14   


 


 


 Ondansetron HCl


  (Zofran)  4 mg  Q6H  PRN


 IVP


 Nausea & Vomiting  2/9/18 06:15


 3/11/18 06:14   


 


 


 Polyethylene


 Glycol


  (Miralax)  17 gm  HSPRN  PRN


 ORAL


 Constipation  2/9/18 06:15


 3/11/18 06:14   


 


 


 Pregabalin


  (Lyrica)  50 mg  Q12HR


 ORAL


   2/9/18 09:00


 3/11/18 08:59  2/11/18 09:06


 


 


 Sennosides


  (Senokot)  1 tab  DAILY


 ORAL


   2/11/18 10:00


 3/13/18 09:59  2/11/18 10:23


 


 


 Sodium Chloride  1,000 ml @ 


 100 mls/hr  Q10H


 IV


   2/10/18 12:00


 3/12/18 11:59  2/11/18 09:04


 


 


 Tamsulosin HCl


  (Flomax)  0.4 mg  BEDTIME


 ORAL


   2/10/18 21:00


 3/12/18 20:59  2/10/18 20:19


 


 


 Temazepam


  (Restoril)  15 mg  HSPRN  PRN


 ORAL


 Insomnia  2/9/18 06:15


 2/16/18 06:14  2/10/18 20:19


 








Allergies:  


Coded Allergies:  


     MORPHINE (Verified  Allergy, Unknown, 2/9/18)


Subjective


81 YO M admitted with diarrhea and dehydration.  Now renal failure.  Cover for 

Int Robin-Dr Fish





Objective





Last Vital Signs








  Date Time  Temp Pulse Resp B/P (MAP) Pulse Ox O2 Delivery O2 Flow Rate FiO2


 


2/11/18 16:57    146/70    


 


2/11/18 16:00 98.4 64 18  99   


 


2/11/18 04:00      Room Air  











Laboratory Tests








Test


  2/11/18


07:18


 


White Blood Count


  6.5 K/UL


(4.8-10.8)


 


Red Blood Count


  3.76 M/UL


(4.70-6.10)  L


 


Hemoglobin


  12.2 G/DL


(14.2-18.0)  L


 


Hematocrit


  35.2 %


(42.0-52.0)  L


 


Mean Corpuscular Volume 94 FL (80-99)  


 


Mean Corpuscular Hemoglobin


  32.6 PG


(27.0-31.0)  H


 


Mean Corpuscular Hemoglobin


Concent 34.8 G/DL


(32.0-36.0)


 


Red Cell Distribution Width


  13.2 %


(11.6-14.8)


 


Platelet Count


  152 K/UL


(150-450)


 


Mean Platelet Volume


  9.2 FL


(6.5-10.1)


 


Neutrophils (%) (Auto)


  61.2 %


(45.0-75.0)


 


Lymphocytes (%) (Auto)


  23.1 %


(20.0-45.0)


 


Monocytes (%) (Auto)


  10.1 %


(1.0-10.0)  H


 


Eosinophils (%) (Auto)


  4.2 %


(0.0-3.0)  H


 


Basophils (%) (Auto)


  1.4 %


(0.0-2.0)


 


Sodium Level


  134 MMOL/L


(136-145)  L


 


Potassium Level


  4.0 MMOL/L


(3.5-5.1)


 


Chloride Level


  104 MMOL/L


()


 


Carbon Dioxide Level


  26 MMOL/L


(21-32)


 


Anion Gap


  4 mmol/L


(5-15)  L


 


Blood Urea Nitrogen


  23 mg/dL


(7-18)  H


 


Creatinine


  1.4 MG/DL


(0.55-1.30)  H


 


Estimat Glomerular Filtration


Rate  mL/min (>60)  


 


 


Glucose Level


  108 MG/DL


()  H


 


Calcium Level


  8.9 MG/DL


(8.5-10.1)


 


Phosphorus Level


  2.6 MG/DL


(2.5-4.9)


 


Magnesium Level


  1.8 MG/DL


(1.8-2.4)


 


Total Bilirubin


  0.3 MG/DL


(0.2-1.0)


 


Aspartate Amino Transf


(AST/SGOT) 14 U/L (15-37)


L


 


Alanine Aminotransferase


(ALT/SGPT) 19 U/L (12-78)


 


 


Alkaline Phosphatase


  55 U/L


()


 


Total Protein


  6.0 G/DL


(6.4-8.2)  L


 


Albumin


  2.7 G/DL


(3.4-5.0)  L


 


Globulin 3.3 g/dL  


 


Albumin/Globulin Ratio


  0.8 (1.0-2.7)


L











Microbiology








 Date/Time


Source Procedure


Growth Status


 


 


 2/9/18 09:30


Stool Clostridium difficile Toxin Assay - Final Complete


 


 2/10/18 09:30


Urine,Clean Catch Urine Culture - Preliminary


NO GROWTH Resulted


 


 2/9/18 06:00


Urine,Clean Catch Urine Culture - Preliminary


Mixed Gram Positive Organism Resulted

















Intake and Output  


 


 2/10/18 2/11/18





 19:00 07:00


 


Intake Total 985 ml 1500 ml


 


Output Total  3100 ml


 


Balance 985 ml -1600 ml


 


  


 


Intake Oral 285 ml 500 ml


 


IV Total 700 ml 1000 ml


 


Output Urine Total  3100 ml








Objective


General Appearance:  WD/WN, no apparent distress, alert


EENT:  PERRL/EOMI, normal ENT inspection


Neck:  non-tender, normal alignment, supple, normal inspection


Cardiovascular:  normal peripheral pulses, normal rate, regular rhythm, no 

gallop/murmur, no JVD


Respiratory/Chest:  chest wall non-tender, lungs clear, normal breath sounds, 

no respiratory distress, no accessory muscle use


Abdomen:  normal bowel sounds, non tender, soft, no organomegaly, no mass


Extremities:  normal range of motion


Neurologic:  CNs II-XII grossly normal, no motor/sensory deficits


Skin:  normal pigmentation, warm/dry





Assessment/Plan


Problem List:  


(1) Renal failure


Assessment & Plan:  Due to dehydration





(2) Near syncope


Assessment & Plan:  Due to hypotension due to dehydration





(3) Diverticulosis


(4) HTN (hypertension)


Assessment & Plan:  Cont norvasc, atenolol and hydralazine





(5) Diabetes mellitus, type II


(6) Hypothyroidism


Assessment & Plan:  Continue synthroid





(7) Hypercholesteremia


(8) Depression


(9) Protracted diarrhea


(10) BPH (benign prostatic hyperplasia)


Assessment & Plan:  Await urology consult.





(11) Dehydration


Assessment & Plan:  Continue IV fluids.





(12) Abdominal aortic aneurysm (AAA) 3.0 cm to 5.5 cm in diameter in male


Assessment & Plan:  Await vascular surgery consult.





Status:  not improved











LEAH LOPES Feb 11, 2018 17:21

## 2018-02-12 VITALS — SYSTOLIC BLOOD PRESSURE: 177 MMHG | DIASTOLIC BLOOD PRESSURE: 77 MMHG

## 2018-02-12 VITALS — DIASTOLIC BLOOD PRESSURE: 93 MMHG | SYSTOLIC BLOOD PRESSURE: 177 MMHG

## 2018-02-12 VITALS — DIASTOLIC BLOOD PRESSURE: 70 MMHG | SYSTOLIC BLOOD PRESSURE: 160 MMHG

## 2018-02-12 VITALS — SYSTOLIC BLOOD PRESSURE: 170 MMHG | DIASTOLIC BLOOD PRESSURE: 60 MMHG

## 2018-02-12 LAB
ADD MANUAL DIFF: NO
ALBUMIN SERPL-MCNC: 3.1 G/DL (ref 3.4–5)
ALBUMIN/GLOB SERPL: 1 {RATIO} (ref 1–2.7)
ALP SERPL-CCNC: 67 U/L (ref 46–116)
ALT SERPL-CCNC: 23 U/L (ref 12–78)
ANION GAP SERPL CALC-SCNC: 8 MMOL/L (ref 5–15)
AST SERPL-CCNC: 15 U/L (ref 15–37)
BASOPHILS NFR BLD AUTO: 1.3 % (ref 0–2)
BILIRUB SERPL-MCNC: 0.3 MG/DL (ref 0.2–1)
BUN SERPL-MCNC: 20 MG/DL (ref 7–18)
CALCIUM SERPL-MCNC: 9.6 MG/DL (ref 8.5–10.1)
CHLORIDE SERPL-SCNC: 102 MMOL/L (ref 98–107)
CO2 SERPL-SCNC: 27 MMOL/L (ref 21–32)
CREAT SERPL-MCNC: 1.5 MG/DL (ref 0.55–1.3)
EOSINOPHIL NFR BLD AUTO: 4.3 % (ref 0–3)
ERYTHROCYTE [DISTWIDTH] IN BLOOD BY AUTOMATED COUNT: 13.4 % (ref 11.6–14.8)
GLOBULIN SER-MCNC: 3.1 G/DL
HCT VFR BLD CALC: 39.6 % (ref 42–52)
HGB BLD-MCNC: 13.3 G/DL (ref 14.2–18)
LYMPHOCYTES NFR BLD AUTO: 19.6 % (ref 20–45)
MCV RBC AUTO: 94 FL (ref 80–99)
MONOCYTES NFR BLD AUTO: 9.9 % (ref 1–10)
NEUTROPHILS NFR BLD AUTO: 64.9 % (ref 45–75)
PHOSPHATE SERPL-MCNC: 2.8 MG/DL (ref 2.5–4.9)
PLATELET # BLD: 175 K/UL (ref 150–450)
POTASSIUM SERPL-SCNC: 4.1 MMOL/L (ref 3.5–5.1)
RBC # BLD AUTO: 4.24 M/UL (ref 4.7–6.1)
SODIUM SERPL-SCNC: 137 MMOL/L (ref 136–145)
WBC # BLD AUTO: 6.5 K/UL (ref 4.8–10.8)

## 2018-02-12 RX ADMIN — INSULIN ASPART SCH UNITS: 100 INJECTION, SOLUTION INTRAVENOUS; SUBCUTANEOUS at 12:54

## 2018-02-12 RX ADMIN — HYDRALAZINE HYDROCHLORIDE SCH MG: 10 TABLET ORAL at 09:08

## 2018-02-12 RX ADMIN — Medication SCH TAB: at 09:08

## 2018-02-12 RX ADMIN — LACTULOSE SCH GM: 20 SOLUTION ORAL at 13:00

## 2018-02-12 RX ADMIN — DOCUSATE SODIUM SCH MG: 100 CAPSULE, LIQUID FILLED ORAL at 09:09

## 2018-02-12 RX ADMIN — DULOXETINE HYDROCHLORIDE SCH MG: 30 CAPSULE, DELAYED RELEASE ORAL at 09:14

## 2018-02-12 RX ADMIN — PREGABALIN SCH MG: 50 CAPSULE ORAL at 09:07

## 2018-02-12 RX ADMIN — INSULIN ASPART SCH UNITS: 100 INJECTION, SOLUTION INTRAVENOUS; SUBCUTANEOUS at 06:42

## 2018-02-12 RX ADMIN — LACTULOSE SCH GM: 20 SOLUTION ORAL at 09:00

## 2018-02-12 RX ADMIN — ALLOPURINOL SCH MG: 100 TABLET ORAL at 09:09

## 2018-02-12 RX ADMIN — DOCUSATE SODIUM SCH MG: 100 CAPSULE, LIQUID FILLED ORAL at 12:53

## 2018-02-12 NOTE — PULMONOLOGY PROGRESS NOTE
Assessment/Plan


Problems:  


(1) JULISSA (acute kidney injury)


(2) Protracted diarrhea


(3) Diverticulosis


(4) Diabetes mellitus, type II


(5) Abdominal aortic aneurysm (AAA) 3.0 cm to 5.5 cm in diameter in male


(6) HTN (hypertension)


(7) Hypothyroidism


Assessment/Plan


iv fluids


iv abx, ID note appreciated





Urology called


GI evaluation appreciated


dvt prophylaxis


check stool


continue abx,  change to PO upon discharge.





Subjective


ROS Limited/Unobtainable:  No


Constitutional:  Reports: no symptoms


HEENT:  Repors: no symptoms


Allergies:  


Coded Allergies:  


     MORPHINE (Verified  Allergy, Unknown, 2/9/18)





Objective





Last 24 Hour Vital Signs








  Date Time  Temp Pulse Resp B/P (MAP) Pulse Ox O2 Delivery O2 Flow Rate FiO2


 


2/12/18 09:10  62  177/74    


 


2/12/18 09:09  62  177/74    


 


2/12/18 09:08    177/74    


 


2/12/18 04:00 97.9 59 18 160/70 97 Room Air  


 


2/11/18 22:50  68  152/71    


 


2/11/18 20:50    182/69    


 


2/11/18 20:22  63  182/69    


 


2/11/18 20:20  63  174/86    


 


2/11/18 20:00 97.7 83 18 174/74 98 Room Air  


 


2/11/18 16:57    146/70    


 


2/11/18 16:00 98.4 64 18 146/70 99   

















Intake and Output  


 


 2/11/18 2/12/18





 19:00 07:00


 


Intake Total 1850 ml 1300 ml


 


Output Total 1100 ml 1700 ml


 


Balance 750 ml -400 ml


 


  


 


Intake Oral 650 ml 500 ml


 


IV Total 1200 ml 800 ml


 


Output Urine Total 1100 ml 1700 ml


 


# Bowel Movements 4 








Objective


General Appearance:  WN/WD


HEENT:  normocephalic, atraumatic


Respiratory/Chest:  chest wall non-tender, normal breath sounds


Cardiovascular:  normal peripheral pulses, normal rate


Abdomen:  soft, non tender, no organomegaly


Genitourinary:  normal external genitalia


Extremities:  no clubbing


Skin:  no rash, no lesions


Neurologic/Psychiatric:  CNs II-XII grossly normal, 


Lymphatic:  no neck adenopathy





Microbiology








 Date/Time


Source Procedure


Growth Status


 


 


 2/10/18 09:30


Urine,Clean Catch Urine Culture - Preliminary


NO GROWTH AFTER 24 HOURS Resulted








Laboratory Tests


2/12/18 10:30: 


White Blood Count 6.5, Red Blood Count 4.24L, Hemoglobin 13.3L, Hematocrit 39.6L

, Mean Corpuscular Volume 94, Mean Corpuscular Hemoglobin 31.4H, Mean 

Corpuscular Hemoglobin Concent 33.6, Red Cell Distribution Width 13.4, Platelet 

Count 175, Mean Platelet Volume 8.8, Neutrophils (%) (Auto) 64.9, Lymphocytes (%

) (Auto) 19.6L, Monocytes (%) (Auto) 9.9, Eosinophils (%) (Auto) 4.3H, 

Basophils (%) (Auto) 1.3, Erythrocyte Sedimentation Rate 53H, Sodium Level 137, 

Potassium Level 4.1, Chloride Level 102, Carbon Dioxide Level 27, Anion Gap 8, 

Blood Urea Nitrogen 20H, Creatinine 1.5H, Estimat Glomerular Filtration Rate , 

Glucose Level 143H, Calcium Level 9.6, Phosphorus Level 2.8, Magnesium Level 2.0

, Total Bilirubin 0.3, Aspartate Amino Transf (AST/SGOT) 15, Alanine 

Aminotransferase (ALT/SGPT) 23, Alkaline Phosphatase 67, Total Protein 6.2L, 

Albumin 3.1L, Globulin 3.1, Albumin/Globulin Ratio 1.0





Current Medications








 Medications


  (Trade)  Dose


 Ordered  Sig/Tramaine


 Route


 PRN Reason  Start Time


 Stop Time Status Last Admin


Dose Admin


 


 Acetaminophen


  (Tylenol)  650 mg  Q4H  PRN


 ORAL


 fever  2/9/18 06:15


 3/11/18 06:14   


 


 


 Acetaminophen


  (Tylenol)  650 mg  Q4H  PRN


 ORAL


 For Pain  2/9/18 13:00


 3/11/18 12:59   


 


 


 Al Hydroxide/Mg


 Hydroxide


  (Mylanta II)  30 ml  Q6H  PRN


 ORAL


 dyspepsia  2/9/18 06:15


 3/11/18 06:14   


 


 


 Allopurinol


  (Zyloprim)  100 mg  BID


 ORAL


   2/9/18 09:00


 3/11/18 08:59  2/12/18 09:09


 


 


 Amlodipine


 Besylate


  (Norvasc)  5 mg  DAILY


 ORAL


   2/9/18 09:00


 3/11/18 08:59  2/12/18 09:09


 


 


 Atenolol


  (Tenormin)  50 mg  DAILY


 ORAL


   2/9/18 09:00


 3/11/18 08:59  2/12/18 09:10


 


 


 Dextrose


  (Dextrose 50%)    STAT  PRN


 IV


 Hypoglycemia  2/9/18 06:15


 3/11/18 06:14   


 


 


 Diphenhydramine


 HCl


  (Benadryl)  25 mg  Q6H  PRN


 ORAL


 Itching/Pruritis  2/9/18 06:15


 3/11/18 06:14   


 


 


 Docusate Sodium


  (Colace)  100 mg  THREE TIMES A  DAY


 ORAL


   2/11/18 13:00


 3/13/18 12:59  2/12/18 09:09


 


 


 Duloxetine HCl


  (Cymbalta)  30 mg  DAILY


 ORAL


   2/9/18 09:00


 3/11/18 08:59  2/12/18 09:14


 


 


 Hydralazine HCl


  (Apresoline)  20 mg  BID


 ORAL


   2/9/18 09:00


 3/11/18 08:59  2/12/18 09:08


 


 


 Hydralazine HCl


  (Apresoline)  50 mg  Q4H  PRN


 ORAL


 For High Blood Pressure  2/11/18 20:45


 3/13/18 20:44  2/11/18 20:50


 


 


 Insulin Aspart


  (NovoLOG)    BEFORE MEALS AND  HS


 SUBQ


   2/9/18 06:30


 3/11/18 06:29  2/12/18 06:42


 


 


 Iron Sucrose 100


 mg/Sodium Chloride  60 ml @ 


 240 mls/hr  BEDTIME


 IV


   2/12/18 21:00


 2/16/18 21:14   


 


 


 Lactulose


  (Cephulac)  30 gm  THREE TIMES A  DAY


 ORAL


   2/11/18 13:00


 3/13/18 12:59  2/11/18 16:57


 


 


 Levofloxacin


  (Levaquin)  250 mg  DAILY


 ORAL


   2/11/18 12:00


 2/24/18 11:59  2/12/18 09:10


 


 


 Levothyroxine


 Sodium


  (Synthroid)  75 mcg  DAILY@0630


 ORAL


   2/9/18 06:30


 3/11/18 06:29  2/12/18 06:40


 


 


 Nitroglycerin


  (Ntg)  0.4 mg  Q5M X 3 DOSES PRN


 SL


 Prn Chest Pain  2/9/18 06:15


 3/11/18 06:14   


 


 


 Ondansetron HCl


  (Zofran)  4 mg  Q6H  PRN


 IVP


 Nausea & Vomiting  2/9/18 06:15


 3/11/18 06:14   


 


 


 Polyethylene


 Glycol


  (Miralax)  17 gm  HSPRN  PRN


 ORAL


 Constipation  2/9/18 06:15


 3/11/18 06:14   


 


 


 Pregabalin


  (Lyrica)  50 mg  Q12HR


 ORAL


   2/9/18 09:00


 3/11/18 08:59  2/12/18 09:07


 


 


 Sennosides


  (Senokot)  1 tab  DAILY


 ORAL


   2/11/18 10:00


 3/13/18 09:59  2/12/18 09:08


 


 


 Sodium Chloride  1,000 ml @ 


 100 mls/hr  Q10H


 IV


   2/10/18 12:00


 3/12/18 11:59  2/12/18 09:10


 


 


 Tamsulosin HCl


  (Flomax)  0.4 mg  BEDTIME


 ORAL


   2/10/18 21:00


 3/12/18 20:59  2/11/18 20:44


 


 


 Temazepam


  (Restoril)  15 mg  HSPRN  PRN


 ORAL


 Insomnia  2/9/18 06:15


 2/16/18 06:14  2/11/18 21:57


 

















ADRIENNE CUNNINGHAM Feb 12, 2018 13:01

## 2018-02-12 NOTE — GI PROGRESS NOTE
Assessment/Plan


Problems:  


(1) Constipated


ICD Codes:  K59.00 - Constipation, unspecified


SNOMED:  13601058


(2) Dehydration


ICD Codes:  E86.0 - Dehydration


SNOMED:  75426788, 570430299


(3) Protracted diarrhea


ICD Codes:  K52.9 - Noninfective gastroenteritis and colitis, unspecified


SNOMED:  488246261


(4) Colitis, acute


ICD Codes:  K52.9 - Noninfective gastroenteritis and colitis, unspecified


SNOMED:  90160667, 849407473


Status:  stable


Status Narrative


Discussed with Dr. Bingham.


Assessment/Plan


abdominal/renal US, see full report >> renal cysts.


stool culture >>  PSEUDOMONAS AERUGINOSA


cdiff negative


iron deficiency >> venofer


OB stool negative





previously recommend colonoscopy, patient refuses at this time and request his 

POC be discussed with his primary care 


renal diet 


abx


electrolyte correction


monitor H&H, prn transfusions


ppi


fu labs


The patient was seen and examined at bedside and all new and available data was 

reviewed in the patients chart. I agree with the above findings, impression 

and plan.  (Patient seen earlier today. Signature stamp does not reflect 

patient encounter time.). - Kiana Bingham MD





Subjective


Subjective


diarrhea resolved


wants to go home





Objective





Last 24 Hour Vital Signs








  Date Time  Temp Pulse Resp B/P (MAP) Pulse Ox O2 Delivery O2 Flow Rate FiO2


 


2/12/18 09:10  62  177/74    


 


2/12/18 09:09  62  177/74    


 


2/12/18 09:08    177/74    


 


2/12/18 04:00 97.9 59 18 160/70 97 Room Air  


 


2/11/18 22:50  68  152/71    


 


2/11/18 20:50    182/69    


 


2/11/18 20:22  63  182/69    


 


2/11/18 20:20  63  174/86    


 


2/11/18 20:00 97.7 83 18 174/74 98 Room Air  


 


2/11/18 16:57    146/70    


 


2/11/18 16:00 98.4 64 18 146/70 99   


 


2/11/18 12:00 97.8 86 18 127/91 99   

















Intake and Output  


 


 2/11/18 2/12/18





 19:00 07:00


 


Intake Total 1850 ml 1300 ml


 


Output Total 1100 ml 1700 ml


 


Balance 750 ml -400 ml


 


  


 


Intake Oral 650 ml 500 ml


 


IV Total 1200 ml 800 ml


 


Output Urine Total 1100 ml 1700 ml


 


# Bowel Movements 4 











Laboratory Tests








Test


  2/12/18


10:30


 


White Blood Count


  6.5 K/UL


(4.8-10.8)


 


Red Blood Count


  4.24 M/UL


(4.70-6.10)  L


 


Hemoglobin


  13.3 G/DL


(14.2-18.0)  L


 


Hematocrit


  39.6 %


(42.0-52.0)  L


 


Mean Corpuscular Volume 94 FL (80-99)  


 


Mean Corpuscular Hemoglobin


  31.4 PG


(27.0-31.0)  H


 


Mean Corpuscular Hemoglobin


Concent 33.6 G/DL


(32.0-36.0)


 


Red Cell Distribution Width


  13.4 %


(11.6-14.8)


 


Platelet Count


  175 K/UL


(150-450)


 


Mean Platelet Volume


  8.8 FL


(6.5-10.1)


 


Neutrophils (%) (Auto)


  64.9 %


(45.0-75.0)


 


Lymphocytes (%) (Auto)


  19.6 %


(20.0-45.0)  L


 


Monocytes (%) (Auto)


  9.9 %


(1.0-10.0)


 


Eosinophils (%) (Auto)


  4.3 %


(0.0-3.0)  H


 


Basophils (%) (Auto)


  1.3 %


(0.0-2.0)


 


Erythrocyte Sedimentation Rate Pending  


 


Sodium Level Pending  


 


Potassium Level Pending  


 


Chloride Level Pending  


 


Carbon Dioxide Level Pending  


 


Blood Urea Nitrogen Pending  


 


Creatinine Pending  


 


Estimat Glomerular Filtration


Rate Pending  


 


 


Glucose Level Pending  


 


Calcium Level Pending  


 


Phosphorus Level Pending  


 


Magnesium Level Pending  


 


Total Bilirubin Pending  


 


Aspartate Amino Transf


(AST/SGOT) Pending  


 


 


Alanine Aminotransferase


(ALT/SGPT) Pending  


 


 


Alkaline Phosphatase Pending  


 


Total Protein Pending  


 


Albumin Pending  


 


Globulin Pending  








Height (Feet):  5


Height (Inches):  6.00


Weight (Pounds):  171


General Appearance:  WD/WN, no apparent distress, alert


Cardiovascular:  normal rate


Respiratory/Chest:  normal breath sounds, no respiratory distress


Abdominal Exam:  normal bowel sounds, non tender, soft


Extremities:  normal range of motion, non-tender











Pascale Graham N.LEX Feb 12, 2018 11:27


SANJAY BINGHAM Feb 12, 2018 15:20

## 2018-02-12 NOTE — INTERNAL MED PROGRESS NOTE
Subjective


Date of Service:  Feb 12, 2018


Physician Name


Leah Lopes


Attending Physician


Dustin Fish MD





Current Medications








 Medications


  (Trade)  Dose


 Ordered  Sig/Tramaine


 Route


 PRN Reason  Start Time


 Stop Time Status Last Admin


Dose Admin


 


 Acetaminophen


  (Tylenol)  650 mg  Q4H  PRN


 ORAL


 fever  2/9/18 06:15


 3/11/18 06:14   


 


 


 Acetaminophen


  (Tylenol)  650 mg  Q4H  PRN


 ORAL


 For Pain  2/9/18 13:00


 3/11/18 12:59   


 


 


 Al Hydroxide/Mg


 Hydroxide


  (Mylanta II)  30 ml  Q6H  PRN


 ORAL


 dyspepsia  2/9/18 06:15


 3/11/18 06:14   


 


 


 Allopurinol


  (Zyloprim)  100 mg  BID


 ORAL


   2/9/18 09:00


 3/11/18 08:59  2/12/18 09:09


 


 


 Amlodipine


 Besylate


  (Norvasc)  5 mg  DAILY


 ORAL


   2/9/18 09:00


 3/11/18 08:59  2/12/18 09:09


 


 


 Atenolol


  (Tenormin)  50 mg  DAILY


 ORAL


   2/9/18 09:00


 3/11/18 08:59  2/12/18 09:10


 


 


 Dextrose


  (Dextrose 50%)    STAT  PRN


 IV


 Hypoglycemia  2/9/18 06:15


 3/11/18 06:14   


 


 


 Diphenhydramine


 HCl


  (Benadryl)  25 mg  Q6H  PRN


 ORAL


 Itching/Pruritis  2/9/18 06:15


 3/11/18 06:14   


 


 


 Docusate Sodium


  (Colace)  100 mg  THREE TIMES A  DAY


 ORAL


   2/11/18 13:00


 3/13/18 12:59  2/12/18 12:53


 


 


 Duloxetine HCl


  (Cymbalta)  30 mg  DAILY


 ORAL


   2/9/18 09:00


 3/11/18 08:59  2/12/18 09:14


 


 


 Hydralazine HCl


  (Apresoline)  20 mg  BID


 ORAL


   2/9/18 09:00


 3/11/18 08:59  2/12/18 09:08


 


 


 Hydralazine HCl


  (Apresoline)  50 mg  Q4H  PRN


 ORAL


 For High Blood Pressure  2/11/18 20:45


 3/13/18 20:44  2/11/18 20:50


 


 


 Insulin Aspart


  (NovoLOG)    BEFORE MEALS AND  HS


 SUBQ


   2/9/18 06:30


 3/11/18 06:29  2/12/18 12:54


 


 


 Iron Sucrose 100


 mg/Sodium Chloride  60 ml @ 


 240 mls/hr  BEDTIME


 IV


   2/12/18 21:00


 2/16/18 21:14   


 


 


 Lactulose


  (Cephulac)  30 gm  THREE TIMES A  DAY


 ORAL


   2/11/18 13:00


 3/13/18 12:59  2/11/18 16:57


 


 


 Levofloxacin


  (Levaquin)  250 mg  DAILY


 ORAL


   2/11/18 12:00


 2/24/18 11:59  2/12/18 09:10


 


 


 Levothyroxine


 Sodium


  (Synthroid)  75 mcg  DAILY@0630


 ORAL


   2/9/18 06:30


 3/11/18 06:29  2/12/18 06:40


 


 


 Nitroglycerin


  (Ntg)  0.4 mg  Q5M X 3 DOSES PRN


 SL


 Prn Chest Pain  2/9/18 06:15


 3/11/18 06:14   


 


 


 Ondansetron HCl


  (Zofran)  4 mg  Q6H  PRN


 IVP


 Nausea & Vomiting  2/9/18 06:15


 3/11/18 06:14   


 


 


 Polyethylene


 Glycol


  (Miralax)  17 gm  HSPRN  PRN


 ORAL


 Constipation  2/9/18 06:15


 3/11/18 06:14   


 


 


 Pregabalin


  (Lyrica)  50 mg  Q12HR


 ORAL


   2/9/18 09:00


 3/11/18 08:59  2/12/18 09:07


 


 


 Sennosides


  (Senokot)  1 tab  DAILY


 ORAL


   2/11/18 10:00


 3/13/18 09:59  2/12/18 09:08


 


 


 Sodium Chloride  1,000 ml @ 


 100 mls/hr  Q10H


 IV


   2/10/18 12:00


 3/12/18 11:59  2/12/18 09:10


 


 


 Tamsulosin HCl


  (Flomax)  0.4 mg  BEDTIME


 ORAL


   2/10/18 21:00


 3/12/18 20:59  2/11/18 20:44


 


 


 Temazepam


  (Restoril)  15 mg  HSPRN  PRN


 ORAL


 Insomnia  2/9/18 06:15


 2/16/18 06:14  2/11/18 21:57


 








Allergies:  


Coded Allergies:  


     MORPHINE (Verified  Allergy, Unknown, 2/9/18)


ROS Limited/Unobtainable:  No


Constitutional:  Reports: no symptoms


HEENT:  Reports: no symptoms


Cardiovascular:  Reports: no symptoms


Respiratory:  Reports: no symptoms


Gastrointestinal/Abdominal:  Reports: no symptoms


Genitourinary:  Reports: no symptoms


Neurologic/Psychiatric:  Reports: no symptoms


Subjective


81 YO M admitted with diarrhea and dehydration.  Now renal failure.  Await 

vascular surgery consult for AAA.  Cover for Int Robin-Dr Fish





Objective





Last Vital Signs








  Date Time  Temp Pulse Resp B/P (MAP) Pulse Ox O2 Delivery O2 Flow Rate FiO2


 


2/12/18 09:10  62  177/74    


 


2/12/18 04:00 97.9  18  97 Room Air  











Laboratory Tests








Test


  2/12/18


10:30


 


White Blood Count


  6.5 K/UL


(4.8-10.8)


 


Red Blood Count


  4.24 M/UL


(4.70-6.10)  L


 


Hemoglobin


  13.3 G/DL


(14.2-18.0)  L


 


Hematocrit


  39.6 %


(42.0-52.0)  L


 


Mean Corpuscular Volume 94 FL (80-99)  


 


Mean Corpuscular Hemoglobin


  31.4 PG


(27.0-31.0)  H


 


Mean Corpuscular Hemoglobin


Concent 33.6 G/DL


(32.0-36.0)


 


Red Cell Distribution Width


  13.4 %


(11.6-14.8)


 


Platelet Count


  175 K/UL


(150-450)


 


Mean Platelet Volume


  8.8 FL


(6.5-10.1)


 


Neutrophils (%) (Auto)


  64.9 %


(45.0-75.0)


 


Lymphocytes (%) (Auto)


  19.6 %


(20.0-45.0)  L


 


Monocytes (%) (Auto)


  9.9 %


(1.0-10.0)


 


Eosinophils (%) (Auto)


  4.3 %


(0.0-3.0)  H


 


Basophils (%) (Auto)


  1.3 %


(0.0-2.0)


 


Erythrocyte Sedimentation Rate


  53 MM/HR


(0-30)  H


 


Sodium Level


  137 MMOL/L


(136-145)


 


Potassium Level


  4.1 MMOL/L


(3.5-5.1)


 


Chloride Level


  102 MMOL/L


()


 


Carbon Dioxide Level


  27 MMOL/L


(21-32)


 


Anion Gap


  8 mmol/L


(5-15)


 


Blood Urea Nitrogen


  20 mg/dL


(7-18)  H


 


Creatinine


  1.5 MG/DL


(0.55-1.30)  H


 


Estimat Glomerular Filtration


Rate  mL/min (>60)  


 


 


Glucose Level


  143 MG/DL


()  H


 


Calcium Level


  9.6 MG/DL


(8.5-10.1)


 


Phosphorus Level


  2.8 MG/DL


(2.5-4.9)


 


Magnesium Level


  2.0 MG/DL


(1.8-2.4)


 


Total Bilirubin


  0.3 MG/DL


(0.2-1.0)


 


Aspartate Amino Transf


(AST/SGOT) 15 U/L (15-37)


 


 


Alanine Aminotransferase


(ALT/SGPT) 23 U/L (12-78)


 


 


Alkaline Phosphatase


  67 U/L


()


 


Total Protein


  6.2 G/DL


(6.4-8.2)  L


 


Albumin


  3.1 G/DL


(3.4-5.0)  L


 


Globulin 3.1 g/dL  


 


Albumin/Globulin Ratio 1.0 (1.0-2.7)  











Microbiology








 Date/Time


Source Procedure


Growth Status


 


 


 2/10/18 09:30


Urine,Clean Catch Urine Culture - Preliminary


NO GROWTH AFTER 24 HOURS Resulted

















Intake and Output  


 


 2/11/18 2/12/18





 19:00 07:00


 


Intake Total 1850 ml 1300 ml


 


Output Total 1100 ml 1700 ml


 


Balance 750 ml -400 ml


 


  


 


Intake Oral 650 ml 500 ml


 


IV Total 1200 ml 800 ml


 


Output Urine Total 1100 ml 1700 ml


 


# Bowel Movements 4 








Objective


General Appearance:  WD/WN, no apparent distress, alert


EENT:  PERRL/EOMI, normal ENT inspection


Neck:  non-tender, normal alignment, supple, normal inspection


Cardiovascular:  normal peripheral pulses, normal rate, regular rhythm, no 

gallop/murmur, no JVD


Respiratory/Chest:  chest wall non-tender, lungs clear, normal breath sounds, 

no respiratory distress, no accessory muscle use


Abdomen:  normal bowel sounds, non tender, soft, no organomegaly, no mass


Extremities:  normal range of motion


Neurologic:  CNs II-XII grossly normal, no motor/sensory deficits


Skin:  normal pigmentation, warm/dry





Assessment/Plan


Problem List:  


(1) Renal failure


Assessment & Plan:  Due to dehydration





(2) Near syncope


Assessment & Plan:  Due to hypotension due to dehydration





(3) Diverticulosis


(4) HTN (hypertension)


Assessment & Plan:  Cont norvasc, atenolol and hydralazine





(5) Diabetes mellitus, type II


(6) Hypothyroidism


Assessment & Plan:  Continue synthroid





(7) Hypercholesteremia


(8) Depression


(9) Protracted diarrhea


(10) BPH (benign prostatic hyperplasia)


Assessment & Plan:  Await urology consult.





(11) Dehydration


Assessment & Plan:  Continue IV fluids.





(12) Abdominal aortic aneurysm (AAA) 3.0 cm to 5.5 cm in diameter in male


Assessment & Plan:  Await vascular surgery consult.





(13) Diarrhea


Assessment & Plan:  Stool cultrue=pseudamonas.  See ID recs.














LEAH LOPES Feb 12, 2018 13:16

## 2018-02-13 NOTE — DIAGNOSTIC IMAGING REPORT
--------------- APPROVED REPORT --------------





CPT Code: 84113



Present Symptoms

Comments: R/O DVT





BILATERAL: Imaging reveals a patent deep venous system bilaterally. There is no evidence 

of thrombus within the femoral, popliteal or tibial segments. The greater saphenous veins 

are also within normal limits. Doppler indicates normal spontaneous flow within these 

segments.

## 2018-02-13 NOTE — DISCHARGE SUMMARY
Discharge Summary


Hospital Course


Date of Admission


Feb 9, 2018 at 06:17


Date of Discharge


Feb 12, 2018 at 16:25


Admitting Diagnosis


Dehydration. colitis.


MAURI Ware is a 82 year old male who was admitted on Feb 9, 2018 at 06:17 

for Dehydration,Colitis


Hospital Course


dc summary #[id number]





Discharge Medications


New Medications:  


Levofloxacin* (Levaquin*) 250 Mg Tablet


250 MG ORAL DAILY for 10 Days, TAB





Tamsulosin HCl (Flomax) 0.4 Mg Cap.er.24h


0.4 MG ORAL BEDTIME for 30 Days, CAP





 


Continued Medications:  


Allopurinol* (Allopurinol*) 100 Mg Tablet


100 MG ORAL BID, TAB





Amlodipine Besylate* (Amlodipine Besylate*) 10 Mg Tablet


0.5 TAB ORAL BID, TAB





Atenolol* (Tenormin*) 50 Mg Tablet


50 MG ORAL DAILY, TAB





Duloxetine Hcl* (Cymbalta*) 30 Mg Capsule.dr


30 MG ORAL DAILY, CAP





Hydralazine Hcl* (Hydralazine Hcl*) 50 Mg Tablet


2 TAB ORAL BID, TAB





Levothyroxine Sodium* (Levothyroxine Sodium*) 25 Mcg Tablet


1.5 TAB ORAL DAILY, TAB


Take in the morning on an empty stomach, at least 30 minutes before


 food.


Linagliptin (Tradjenta) 5 Mg Tablet


0.5 TAB PO BID, TAB





Pregabalin (Lyrica) 50 Mg Capsule


50 MG ORAL BID, CAP











Discharge


Condition Upon Discharge:  stable


Discharge Disposition


Patient was discharged to Home (01)


Discharge Diagnoses:  





Discharge Instructions


Discharge Instructions


Special Instructions


I have been assigned to complete a D/C Summary on this account. I was not 

involved in the patient management











Usha Holly NP ([place]) Feb 13, 2018 11:44

## 2018-02-14 NOTE — DISCHARGE SUMMARY 2 SIG
DATE OF ADMISSION:  02/09/2018



DATE OF DISCHARGE:  02/12/2018



REASON FOR ADMISSION:    82-year-old male with a history of diabetes, 

coronary artery disease, and hypertension, presented from home with complaints 

of profuse watery diarrhea for three days as well as the weakness and near 

syncope.  He reported generalized weakness and abdominal cramps.  He felt 

lightheaded and near syncope, but did not lose his consciousness while sitting 

on the toilet and felt better when he was lying down.  Per paramedic, the 

patient was hypotensive.  Upon evaluation in the emergency room, blood 

pressure was stable.  No recent antibiotic use.

Vital signs were stable.  WBC -12.2, hemoglobin- 12.4, and hematocrit -36.5.

BUN -34 and creatinine -2.0.  Troponin was negative.  Albumin -3.2.

CT of the abdomen and pelvis revealed colonic diverticulosis, enlarged

prostate gland, and mild circumferential wall thickening of the sigmoid

and rectum.  No evidence of diverticulitis.  5.1 cm saccular infrarenal

abdominal aortic aneurysm.  Bilateral renal cysts.  EKG showed normal sinus

rhythm.  No acute ischemic changes.  Urinalysis was suggestive of urinary

tract infection.  The patient was admitted with diagnoses of dehydration,

acute kidney injury, acute colitis, near syncope, protracted diarrhea,

probably urinary tract infection, and diverticulosis.



HOSPITAL COURSE:  The patient was admitted.  The patient was on the IV

fluids.  ID and GI consults were requested.  Stool culture was negative.

Stool for C. difficile was negative.  Urine culture showed gram-positive

organism more than 100,000.  A repeated urine culture revealed also

gram-positive organism with colony count only 10 to 20.  The patient was

on empiric antibiotics for presumed acute colitis, which resolved.  GI

closely followed the patient.  GI recommended colonoscopy, however, the

patient refused and decided  to discuss with  his primary care provider prior  

doing it.  Diet was slowly advanced and  patient was able to tolerate diet.

Electrolytes were closely monitored and corrected as needed.  Anemia

workup was consistent with anemia of chronic disease.  Hemoglobin and

hematocrit were closely monitored.  No need for transfusion unless

symptomatic or hemoglobin below 7.  Stool OB was negative.  TSH was within

normal limits.  The patient was started on PPI and DVT prophylaxis.  TSH

was within normal limits.  Continue current dose of levothyroxine.  Blood

pressure medication resumed later when blood pressure was stable.  

Blood sugar was managed with sliding scale of insulin.  Diarrhea resolved.  

Symptomatic treatment. Antidiarrheal provided as needed.  Recommended 

serial monitoring of aortic aneurysm with ultrasound every six months.  

Renal ultrasound revealed multiple bilateral simple-appearing renal cysts

with conglomerate lesion in the upper pole of the right kidney measuring 

4.9 x 4.4 cm not definitely cystic in nature.  Recommended  to have

contrast-enhanced  CT or MRI to exclude the possibility of malignancy.  

Markedly enlarged prostate with 7.4 by 6.1 cm noted.  

Abdominal ultrasound revealed no evidence of gallstones to suggest acute 

cholecystitis. Multiple bilateral simple-appearing renal cysts.  Conglomerate 

lesion in the upper pole of right kidney measuring 4.9 x 4.4 cm, not definitely

cystic in nature.    Family declined the MRI to be done.  Unable to do the CT 

with the contrast secondary to renal failure. With hydration, BUN from 34 

down to 20 and creatinine from 2.0 down to 1.5.  The patient likely had acute 

kidney injury with element of chronic renal insufficiency.  Venous duplex of 

bilateral lower extremity was negative.  Hemoglobin and hematocrit were 

closely monitored and remained stable.  The patient was placed on Flomax 

due to possible urinary obstruction. Prior to discharge, hemoglobin -13.3 

and hematocrit -39.6.  Noted elevated ESR -53.  Coag profile was stable.  

Hemoglobin A1 -7.0, at goal.  

Near syncope episode was likely brought by hypotension and dehydration.  

Syncope was apparently vasovagal that occurred when the patient was on the 

toilet and was  hypotensive at that time and dehydrated due to diarrhea.   



The patient was stable for discharge home and  follow up with the primary care 
provider.



DISCHARGE MEDICATIONS:  See medication reconciliation list.  The patient

was placed on Flomax to improve voiding.  Continue all other

medication as outlined in medication reconciliation list.



DISCHARGE INSTRUCTIONS:  The patient was discharged home.  Follow up with

the primary care provider to discuss recommended colonoscopy.

Reconsider to have  outpatient MRI for right kidney lesion



FINAL DIAGNOSES:

1. Acute colitis, resolving.

2. Dehydration.

3. Near syncope due to hypotension and dehydration   

4. Acute kidney injury.

5. Protracted diarrhea, resolved.

6. Acute kidney injury, improved.

7. Diverticulosis.

8. Diabetes.

9. Saccular infrarenal aortic abdominal aneurysm 5.1 cm .

10. Hypertension.

11. Hypothyroidism.

12. Urinary retention.

13. Possible urinary obstruction secondary to prostatomegaly.  







  ______________________________________________

  Dustin Fish M.D.



I have been assigned to dictate discharge summary on this account and I

was not involved in the patient's management.



  ______________________________________________

  Usha ThurstonKARLA jorge





DR:  CHRISTIE

D:  02/13/2018 11:43

T:  02/14/2018 02:47

JOB#:  [id number]

CC:



THUAN

## 2018-02-22 NOTE — CONSULTATION
DATE OF CONSULTATION:  02/12/2018



REASON FOR CONSULTATION:  Abdominal aortic aneurysm.



HISTORY OF PRESENT ILLNESS:  This is a very pleasant 82-year-old gentleman

that I was asked to evaluate in regards to his newly discovered aortic

abdominal aneurysm.   The patient was seen and evaluated and a full

history and physical exam was done.  In summary, I discussed with the

patient the presence of his aneurysm and the need for further evaluation,

which would be a CT angio.  However, at this point in time, the patient

does not want to have any surgical procedures done and obviously no need

for further evaluation is present.  I have discussed with the patient the

fact that if he changes mind, we will need to do workup and then

reevaluate him for possible surgery, which will consist of stent

placement.  In the meantime, I recommend continuing same medical

management, which consisted keeping the blood pressure control using

beta-blockers for the heart rate.









  ______________________________________________

  Stephen Nixon M.D.





DR:  Sobia

D:  02/21/2018 17:36

T:  02/22/2018 00:26

JOB#:  [id number]

CC: